# Patient Record
Sex: FEMALE | Race: BLACK OR AFRICAN AMERICAN | Employment: FULL TIME | URBAN - METROPOLITAN AREA
[De-identification: names, ages, dates, MRNs, and addresses within clinical notes are randomized per-mention and may not be internally consistent; named-entity substitution may affect disease eponyms.]

---

## 2017-08-12 ENCOUNTER — HOSPITAL ENCOUNTER (EMERGENCY)
Age: 32
Discharge: HOME | End: 2017-08-12
Attending: EMERGENCY MEDICINE
Payer: COMMERCIAL

## 2017-08-12 VITALS
TEMPERATURE: 97.4 F | SYSTOLIC BLOOD PRESSURE: 148 MMHG | HEIGHT: 62 IN | HEART RATE: 87 BPM | DIASTOLIC BLOOD PRESSURE: 87 MMHG | BODY MASS INDEX: 37.08 KG/M2 | OXYGEN SATURATION: 100 % | RESPIRATION RATE: 16 BRPM | WEIGHT: 201.5 LBS

## 2017-08-12 DIAGNOSIS — L05.01 PILONIDAL ABSCESS: Primary | ICD-10-CM

## 2017-08-12 PROCEDURE — 10060 I&D ABSCESS SIMPLE/SINGLE: CPT

## 2017-08-12 PROCEDURE — 2500000003 HC RX 250 WO HCPCS: Performed by: NURSE PRACTITIONER

## 2017-08-12 PROCEDURE — 87070 CULTURE OTHR SPECIMN AEROBIC: CPT

## 2017-08-12 PROCEDURE — 99282 EMERGENCY DEPT VISIT SF MDM: CPT

## 2017-08-12 PROCEDURE — 87205 SMEAR GRAM STAIN: CPT

## 2017-08-12 RX ORDER — OXYCODONE HYDROCHLORIDE AND ACETAMINOPHEN 5; 325 MG/1; MG/1
1 TABLET ORAL EVERY 4 HOURS PRN
COMMUNITY
End: 2017-12-15

## 2017-08-12 RX ORDER — LIDOCAINE HYDROCHLORIDE 10 MG/ML
20 INJECTION, SOLUTION INFILTRATION; PERINEURAL ONCE
Status: COMPLETED | OUTPATIENT
Start: 2017-08-12 | End: 2017-08-12

## 2017-08-12 RX ORDER — IBUPROFEN 400 MG/1
400 TABLET ORAL EVERY 6 HOURS PRN
COMMUNITY
End: 2017-12-15

## 2017-08-12 RX ORDER — HYDROCODONE BITARTRATE AND ACETAMINOPHEN 5; 325 MG/1; MG/1
1 TABLET ORAL EVERY 6 HOURS PRN
Qty: 15 TABLET | Refills: 0 | Status: SHIPPED | OUTPATIENT
Start: 2017-08-12 | End: 2017-12-15

## 2017-08-12 RX ADMIN — LIDOCAINE HYDROCHLORIDE 20 ML: 10 INJECTION, SOLUTION INFILTRATION; PERINEURAL at 14:35

## 2017-08-12 ASSESSMENT — PAIN DESCRIPTION - LOCATION: LOCATION: COCCYX

## 2017-08-12 ASSESSMENT — ENCOUNTER SYMPTOMS: COLOR CHANGE: 1

## 2017-08-12 ASSESSMENT — PAIN SCALES - GENERAL
PAINLEVEL_OUTOF10: 10
PAINLEVEL_OUTOF10: 10

## 2017-08-13 ENCOUNTER — HOSPITAL ENCOUNTER (EMERGENCY)
Age: 32
Discharge: HOME | End: 2017-08-13
Attending: EMERGENCY MEDICINE
Payer: COMMERCIAL

## 2017-08-13 VITALS
DIASTOLIC BLOOD PRESSURE: 70 MMHG | HEIGHT: 62 IN | SYSTOLIC BLOOD PRESSURE: 122 MMHG | RESPIRATION RATE: 16 BRPM | BODY MASS INDEX: 37.17 KG/M2 | WEIGHT: 202 LBS | HEART RATE: 92 BPM | TEMPERATURE: 97.9 F | OXYGEN SATURATION: 98 %

## 2017-08-13 DIAGNOSIS — Z51.89 WOUND CHECK, ABSCESS: ICD-10-CM

## 2017-08-13 DIAGNOSIS — L05.01 PILONIDAL ABSCESS: Primary | ICD-10-CM

## 2017-08-13 PROCEDURE — 99282 EMERGENCY DEPT VISIT SF MDM: CPT

## 2017-08-13 ASSESSMENT — PAIN SCALES - GENERAL: PAINLEVEL_OUTOF10: 5

## 2017-08-13 ASSESSMENT — ENCOUNTER SYMPTOMS: COLOR CHANGE: 1

## 2017-08-13 ASSESSMENT — PAIN DESCRIPTION - LOCATION: LOCATION: COCCYX

## 2017-08-13 ASSESSMENT — PAIN DESCRIPTION - DESCRIPTORS: DESCRIPTORS: TENDER

## 2017-08-14 LAB
CULTURE: ABNORMAL
CULTURE: ABNORMAL
DIRECT EXAM: ABNORMAL
Lab: ABNORMAL
SPECIMEN DESCRIPTION: ABNORMAL
SPECIMEN DESCRIPTION: ABNORMAL
STATUS: ABNORMAL

## 2017-12-15 ENCOUNTER — HOSPITAL ENCOUNTER (EMERGENCY)
Age: 32
Discharge: HOME | End: 2017-12-15
Attending: EMERGENCY MEDICINE
Payer: COMMERCIAL

## 2017-12-15 ENCOUNTER — APPOINTMENT (OUTPATIENT)
Dept: CT IMAGING | Age: 32
End: 2017-12-15
Payer: COMMERCIAL

## 2017-12-15 VITALS
TEMPERATURE: 98.6 F | RESPIRATION RATE: 16 BRPM | WEIGHT: 208 LBS | SYSTOLIC BLOOD PRESSURE: 130 MMHG | HEIGHT: 62 IN | OXYGEN SATURATION: 100 % | BODY MASS INDEX: 38.28 KG/M2 | HEART RATE: 76 BPM | DIASTOLIC BLOOD PRESSURE: 70 MMHG

## 2017-12-15 DIAGNOSIS — R19.7 NAUSEA VOMITING AND DIARRHEA: Primary | ICD-10-CM

## 2017-12-15 DIAGNOSIS — R11.2 NAUSEA VOMITING AND DIARRHEA: Primary | ICD-10-CM

## 2017-12-15 LAB
-: ABNORMAL
ABSOLUTE EOS #: 0.1 K/UL (ref 0–0.4)
ABSOLUTE IMMATURE GRANULOCYTE: ABNORMAL K/UL (ref 0–0.3)
ABSOLUTE LYMPH #: 3.1 K/UL (ref 1–4.8)
ABSOLUTE MONO #: 0.3 K/UL (ref 0.2–0.8)
AMORPHOUS: ABNORMAL
ANION GAP SERPL CALCULATED.3IONS-SCNC: 13 MMOL/L (ref 9–17)
BACTERIA: ABNORMAL
BASOPHILS # BLD: 1 % (ref 0–2)
BASOPHILS ABSOLUTE: 0 K/UL (ref 0–0.2)
BILIRUBIN URINE: NEGATIVE
BUN BLDV-MCNC: 6 MG/DL (ref 6–20)
BUN/CREAT BLD: 9 (ref 9–20)
CALCIUM SERPL-MCNC: 8.9 MG/DL (ref 8.6–10.4)
CASTS UA: ABNORMAL /LPF
CHLORIDE BLD-SCNC: 103 MMOL/L (ref 98–107)
CHP ED QC CHECK: NORMAL
CO2: 20 MMOL/L (ref 20–31)
COLOR: YELLOW
COMMENT UA: ABNORMAL
CREAT SERPL-MCNC: 0.68 MG/DL (ref 0.5–0.9)
CRYSTALS, UA: ABNORMAL /HPF
DIFFERENTIAL TYPE: ABNORMAL
EOSINOPHILS RELATIVE PERCENT: 1 % (ref 1–4)
EPITHELIAL CELLS UA: ABNORMAL /HPF (ref 0–5)
GFR AFRICAN AMERICAN: >60 ML/MIN
GFR NON-AFRICAN AMERICAN: >60 ML/MIN
GFR SERPL CREATININE-BSD FRML MDRD: NORMAL ML/MIN/{1.73_M2}
GFR SERPL CREATININE-BSD FRML MDRD: NORMAL ML/MIN/{1.73_M2}
GLUCOSE BLD-MCNC: 88 MG/DL (ref 70–99)
GLUCOSE URINE: NEGATIVE
HCT VFR BLD CALC: 33.4 % (ref 36–46)
HEMOGLOBIN: 10.6 G/DL (ref 12–16)
IMMATURE GRANULOCYTES: ABNORMAL %
KETONES, URINE: NEGATIVE
LEUKOCYTE ESTERASE, URINE: NEGATIVE
LYMPHOCYTES # BLD: 36 % (ref 24–44)
MCH RBC QN AUTO: 25.3 PG (ref 26–34)
MCHC RBC AUTO-ENTMCNC: 31.7 G/DL (ref 31–37)
MCV RBC AUTO: 80 FL (ref 80–100)
MONOCYTES # BLD: 3 % (ref 1–7)
MUCUS: ABNORMAL
NITRITE, URINE: NEGATIVE
OTHER OBSERVATIONS UA: ABNORMAL
PDW BLD-RTO: 17.3 % (ref 11.5–14.5)
PH UA: 8 (ref 5–8)
PLATELET # BLD: 264 K/UL (ref 130–400)
PLATELET ESTIMATE: ABNORMAL
PMV BLD AUTO: 8.1 FL (ref 6–12)
POTASSIUM SERPL-SCNC: 4.1 MMOL/L (ref 3.7–5.3)
PREGNANCY TEST URINE, POC: NORMAL
PROTEIN UA: NEGATIVE
RBC # BLD: 4.17 M/UL (ref 4–5.2)
RBC # BLD: ABNORMAL 10*6/UL
RBC UA: ABNORMAL /HPF (ref 0–2)
RENAL EPITHELIAL, UA: ABNORMAL /HPF
SEG NEUTROPHILS: 59 % (ref 36–66)
SEGMENTED NEUTROPHILS ABSOLUTE COUNT: 5.1 K/UL (ref 1.8–7.7)
SODIUM BLD-SCNC: 136 MMOL/L (ref 135–144)
SPECIFIC GRAVITY UA: 1.01 (ref 1–1.03)
TRICHOMONAS: ABNORMAL
TURBIDITY: ABNORMAL
URINE HGB: NEGATIVE
UROBILINOGEN, URINE: NORMAL
WBC # BLD: 8.6 K/UL (ref 3.5–11)
WBC # BLD: ABNORMAL 10*3/UL
WBC UA: ABNORMAL /HPF (ref 0–5)
YEAST: ABNORMAL

## 2017-12-15 PROCEDURE — 84703 CHORIONIC GONADOTROPIN ASSAY: CPT

## 2017-12-15 PROCEDURE — 99284 EMERGENCY DEPT VISIT MOD MDM: CPT

## 2017-12-15 PROCEDURE — 96361 HYDRATE IV INFUSION ADD-ON: CPT

## 2017-12-15 PROCEDURE — 81001 URINALYSIS AUTO W/SCOPE: CPT

## 2017-12-15 PROCEDURE — 85025 COMPLETE CBC W/AUTO DIFF WBC: CPT

## 2017-12-15 PROCEDURE — 74177 CT ABD & PELVIS W/CONTRAST: CPT

## 2017-12-15 PROCEDURE — 96374 THER/PROPH/DIAG INJ IV PUSH: CPT

## 2017-12-15 PROCEDURE — 6360000004 HC RX CONTRAST MEDICATION: Performed by: EMERGENCY MEDICINE

## 2017-12-15 PROCEDURE — 80048 BASIC METABOLIC PNL TOTAL CA: CPT

## 2017-12-15 PROCEDURE — 2580000003 HC RX 258: Performed by: EMERGENCY MEDICINE

## 2017-12-15 PROCEDURE — 6360000002 HC RX W HCPCS: Performed by: EMERGENCY MEDICINE

## 2017-12-15 RX ORDER — 0.9 % SODIUM CHLORIDE 0.9 %
1000 INTRAVENOUS SOLUTION INTRAVENOUS ONCE
Status: COMPLETED | OUTPATIENT
Start: 2017-12-15 | End: 2017-12-15

## 2017-12-15 RX ORDER — SODIUM CHLORIDE 0.9 % (FLUSH) 0.9 %
10 SYRINGE (ML) INJECTION PRN
Status: DISCONTINUED | OUTPATIENT
Start: 2017-12-15 | End: 2017-12-15 | Stop reason: HOSPADM

## 2017-12-15 RX ORDER — OMEPRAZOLE 20 MG/1
20 CAPSULE, DELAYED RELEASE ORAL DAILY
Qty: 15 CAPSULE | Refills: 0 | Status: SHIPPED | OUTPATIENT
Start: 2017-12-15 | End: 2018-09-12 | Stop reason: ALTCHOICE

## 2017-12-15 RX ORDER — ONDANSETRON 2 MG/ML
8 INJECTION INTRAMUSCULAR; INTRAVENOUS ONCE
Status: COMPLETED | OUTPATIENT
Start: 2017-12-15 | End: 2017-12-15

## 2017-12-15 RX ORDER — ONDANSETRON 4 MG/1
4 TABLET, FILM COATED ORAL EVERY 6 HOURS PRN
Qty: 10 TABLET | Refills: 0 | Status: SHIPPED | OUTPATIENT
Start: 2017-12-15 | End: 2018-09-12 | Stop reason: ALTCHOICE

## 2017-12-15 RX ORDER — 0.9 % SODIUM CHLORIDE 0.9 %
50 INTRAVENOUS SOLUTION INTRAVENOUS ONCE
Status: COMPLETED | OUTPATIENT
Start: 2017-12-15 | End: 2017-12-15

## 2017-12-15 RX ADMIN — Medication 10 ML: at 17:16

## 2017-12-15 RX ADMIN — SODIUM CHLORIDE 1000 ML: 9 INJECTION, SOLUTION INTRAVENOUS at 17:03

## 2017-12-15 RX ADMIN — SODIUM CHLORIDE 50 ML: 9 INJECTION, SOLUTION INTRAVENOUS at 17:16

## 2017-12-15 RX ADMIN — IOPAMIDOL 125 ML: 755 INJECTION, SOLUTION INTRAVENOUS at 17:15

## 2017-12-15 RX ADMIN — ONDANSETRON 8 MG: 2 INJECTION INTRAMUSCULAR; INTRAVENOUS at 17:03

## 2017-12-15 ASSESSMENT — PAIN SCALES - GENERAL: PAINLEVEL_OUTOF10: 7

## 2017-12-15 NOTE — ED PROVIDER NOTES
Partners: Female     Other Topics Concern    None     Social History Narrative    None       SCREENINGS             PHYSICAL EXAM    (up to 7 for level 4, 8 or more for level 5)     ED Triage Vitals [12/15/17 1623]   BP Temp Temp Source Pulse Resp SpO2 Height Weight   130/70 98.6 °F (37 °C) Oral 76 16 100 % 5' 2\" (1.575 m) 208 lb (94.3 kg)       Physical Exam   Constitutional: She appears well-developed and well-nourished. No distress. HENT:   Head: Normocephalic. Right Ear: External ear normal.   Left Ear: External ear normal.   Nose: Nose normal.   Mouth/Throat: Oropharynx is clear and moist.   Eyes: EOM are normal. Pupils are equal, round, and reactive to light. Neck: Neck supple. Cardiovascular: Normal rate, regular rhythm and normal heart sounds. Pulmonary/Chest: Effort normal and breath sounds normal. No respiratory distress. Abdominal: Normal appearance. She exhibits no distension and no mass. Bowel sounds are decreased. There is no hepatosplenomegaly. There is tenderness in the right lower quadrant, suprapubic area and left lower quadrant. There is no rigidity, no rebound, no guarding and no CVA tenderness. Musculoskeletal: Normal range of motion. Skin: Skin is warm and dry. No pallor. Vitals reviewed. DIAGNOSTIC RESULTS     EKG: All EKG's are interpreted by the Emergency Department Physician who either signs or Co-signs this chart in the absence of a cardiologist.    RADIOLOGY:   Non-plain film images such as CT, Ultrasound and MRI are read by the radiologist. Plain radiographic images are visualized and preliminarily interpreted by the emergency physician with the below findings:    Interpretation per the Radiologist below, if available at the time of this note:    Zain   Final Result   1. No CT evidence for acute appendicitis. Normal gas-filled appendix   identified.    2. Low-density lesion which is indeterminate at the anterior midpole of the left kidney measuring 1.4 cm. Initial further evaluation with renal   ultrasound is recommended to confirm that this is indeed a cyst.   3. No hydronephrosis. 4. Small amount of free fluid in the pelvis. 5. Retroaortic left renal vein. 6. Possible fibroid uterus. Consider further evaluation with nonemergent   pelvic ultrasound. 7. Prior cholecystectomy. ED BEDSIDE ULTRASOUND:   Performed by ED Physician - none    LABS:  Labs Reviewed   UA W/REFLEX CULTURE - Abnormal; Notable for the following:        Result Value    Turbidity UA SLIGHTLY CLOUDY (*)     All other components within normal limits   MICROSCOPIC URINALYSIS - Abnormal; Notable for the following:     Bacteria, UA FEW (*)     All other components within normal limits   CBC WITH AUTO DIFFERENTIAL - Abnormal; Notable for the following:     Hemoglobin 10.6 (*)     Hematocrit 33.4 (*)     MCH 25.3 (*)     RDW 17.3 (*)     All other components within normal limits   POCT URINE PREGNANCY - Normal   BASIC METABOLIC PANEL   POCT URINALYSIS DIPSTICK       All other labs were within normal range or not returned as of this dictation. EMERGENCY DEPARTMENT COURSE and DIFFERENTIAL DIAGNOSIS/MDM:   Vitals:    Vitals:    12/15/17 1623   BP: 130/70   Pulse: 76   Resp: 16   Temp: 98.6 °F (37 °C)   TempSrc: Oral   SpO2: 100%   Weight: 208 lb (94.3 kg)   Height: 5' 2\" (1.575 m)       CT abdomen is negative for acute appendicitis or other acute surgical pathology. Patient's blood work is reviewed and is otherwise unremarkable. She is treated with IV fluids and IV Zofran and placed on Zofran and omeprazole upon discharge. MDM    CONSULTS:  None    PROCEDURES:  Unless otherwise noted below, none     Procedures    FINAL IMPRESSION      1.  Nausea vomiting and diarrhea          DISPOSITION/PLAN   DISPOSITION Decision to Discharge    PATIENT REFERRED TO:  [facility] ED  [address] [facility]  [phone]    As needed, If symptoms worsen    Primary Care Physician  Call [phone] for physician referral.  Schedule an appointment as soon as possible for a visit         DISCHARGE MEDICATIONS:  New Prescriptions    OMEPRAZOLE (PRILOSEC) 20 MG DELAYED RELEASE CAPSULE    Take 1 capsule by mouth daily    ONDANSETRON (ZOFRAN) 4 MG TABLET    Take 1 tablet by mouth every 6 hours as needed for Nausea or Vomiting          Problem List:  Patient Active Problem List   Diagnosis Code    Pilonidal cyst L05.91           Summation      Patient Course:  Stable    ED Medications administered this visit:    Medications   sodium chloride flush 0.9 % injection 10 mL (10 mLs Intravenous Given 12/15/17 1716)   ondansetron (ZOFRAN) injection 8 mg (8 mg Intravenous Given 12/15/17 1703)   0.9 % sodium chloride bolus (1,000 mLs Intravenous New Bag 12/15/17 1703)   iopamidol (ISOVUE-370) 76 % injection 125 mL (125 mLs Intravenous Given 12/15/17 1715)   0.9 % sodium chloride bolus (0 mLs Intravenous Stopped 12/15/17 1752)       New Prescriptions from this visit:    New Prescriptions    OMEPRAZOLE (PRILOSEC) 20 MG DELAYED RELEASE CAPSULE    Take 1 capsule by mouth daily    ONDANSETRON (ZOFRAN) 4 MG TABLET    Take 1 tablet by mouth every 6 hours as needed for Nausea or Vomiting       Follow-up:  [address] [place] [address] ED  [address] [facility]  [phone]    As needed, If symptoms worsen    Primary Care Physician  Call [phone] for physician referral.  Schedule an appointment as soon as possible for a visit           Final Impression:   1.  Nausea vomiting and diarrhea               (Please note that portions of this note were completed with a voice recognition program.  Efforts were made to edit the dictations but occasionally words are mis-transcribed.)    Delaney Aburto MD (electronically signed)  Attending Emergency Physician            Delaney Aburto MD  12/15/17 5530

## 2017-12-18 LAB — HCG, PREGNANCY URINE (POC): NEGATIVE

## 2018-09-12 ENCOUNTER — OFFICE VISIT (OUTPATIENT)
Dept: INTERNAL MEDICINE CLINIC | Age: 33
End: 2018-09-12
Payer: MEDICAID

## 2018-09-12 VITALS
HEART RATE: 88 BPM | DIASTOLIC BLOOD PRESSURE: 76 MMHG | BODY MASS INDEX: 37.02 KG/M2 | SYSTOLIC BLOOD PRESSURE: 122 MMHG | WEIGHT: 201.2 LBS | RESPIRATION RATE: 20 BRPM | HEIGHT: 62 IN

## 2018-09-12 DIAGNOSIS — Z13.31 POSITIVE DEPRESSION SCREENING: ICD-10-CM

## 2018-09-12 DIAGNOSIS — H92.01 RIGHT EAR PAIN: ICD-10-CM

## 2018-09-12 DIAGNOSIS — E55.9 VITAMIN D DEFICIENCY: ICD-10-CM

## 2018-09-12 DIAGNOSIS — Z13.29 THYROID DISORDER SCREENING: ICD-10-CM

## 2018-09-12 DIAGNOSIS — Z13.220 SCREENING CHOLESTEROL LEVEL: ICD-10-CM

## 2018-09-12 DIAGNOSIS — E66.09 CLASS 2 OBESITY DUE TO EXCESS CALORIES WITHOUT SERIOUS COMORBIDITY WITH BODY MASS INDEX (BMI) OF 36.0 TO 36.9 IN ADULT: Primary | ICD-10-CM

## 2018-09-12 DIAGNOSIS — R35.0 URINARY FREQUENCY: ICD-10-CM

## 2018-09-12 LAB — HBA1C MFR BLD: 5.4 %

## 2018-09-12 PROCEDURE — 1036F TOBACCO NON-USER: CPT | Performed by: NURSE PRACTITIONER

## 2018-09-12 PROCEDURE — 99204 OFFICE O/P NEW MOD 45 MIN: CPT | Performed by: NURSE PRACTITIONER

## 2018-09-12 PROCEDURE — G8417 CALC BMI ABV UP PARAM F/U: HCPCS | Performed by: NURSE PRACTITIONER

## 2018-09-12 PROCEDURE — 96160 PT-FOCUSED HLTH RISK ASSMT: CPT | Performed by: NURSE PRACTITIONER

## 2018-09-12 PROCEDURE — 83036 HEMOGLOBIN GLYCOSYLATED A1C: CPT | Performed by: NURSE PRACTITIONER

## 2018-09-12 PROCEDURE — G8431 POS CLIN DEPRES SCRN F/U DOC: HCPCS | Performed by: NURSE PRACTITIONER

## 2018-09-12 PROCEDURE — G8427 DOCREV CUR MEDS BY ELIG CLIN: HCPCS | Performed by: NURSE PRACTITIONER

## 2018-09-12 ASSESSMENT — PATIENT HEALTH QUESTIONNAIRE - PHQ9
4. FEELING TIRED OR HAVING LITTLE ENERGY: 1
7. TROUBLE CONCENTRATING ON THINGS, SUCH AS READING THE NEWSPAPER OR WATCHING TELEVISION: 1
5. POOR APPETITE OR OVEREATING: 2
SUM OF ALL RESPONSES TO PHQ QUESTIONS 1-9: 11
10. IF YOU CHECKED OFF ANY PROBLEMS, HOW DIFFICULT HAVE THESE PROBLEMS MADE IT FOR YOU TO DO YOUR WORK, TAKE CARE OF THINGS AT HOME, OR GET ALONG WITH OTHER PEOPLE: 0
6. FEELING BAD ABOUT YOURSELF - OR THAT YOU ARE A FAILURE OR HAVE LET YOURSELF OR YOUR FAMILY DOWN: 0
8. MOVING OR SPEAKING SO SLOWLY THAT OTHER PEOPLE COULD HAVE NOTICED. OR THE OPPOSITE, BEING SO FIGETY OR RESTLESS THAT YOU HAVE BEEN MOVING AROUND A LOT MORE THAN USUAL: 0
9. THOUGHTS THAT YOU WOULD BE BETTER OFF DEAD, OR OF HURTING YOURSELF: 0
1. LITTLE INTEREST OR PLEASURE IN DOING THINGS: 3
3. TROUBLE FALLING OR STAYING ASLEEP: 3
SUM OF ALL RESPONSES TO PHQ QUESTIONS 1-9: 11
SUM OF ALL RESPONSES TO PHQ9 QUESTIONS 1 & 2: 4
2. FEELING DOWN, DEPRESSED OR HOPELESS: 1

## 2018-09-12 NOTE — PROGRESS NOTES
Smoking status: Former Smoker    Smokeless tobacco: Never Used    Alcohol use Yes      Comment: occ     ALLERGIES:  No Known Allergies       Subjective     · Constitutional:  Negative for activity change, appetite change, chills, fatigue, fever and unexpected weight change. · HENT: Negative for congestion, rhinorrhea, sinus pain, sinus pressure and sore throat. Positive for right ear discomfort  · Eyes:  Negative for pain and discharge. · Respiratory:  Negative for cough, chest tightness, shortness of breath and wheezing. · Cardiovascular:  Negative for chest pain, palpitations and leg swelling. · Gastrointestinal: Negative for abdominal pain, blood in stool, constipation,diarrhea, nausea and vomiting. · Endocrine: Negative for cold intolerance, heat intolerance, polydipsia, polyphagia and polyuria. · Genitourinary: Negative for difficulty urinating, dysuria, flank pain, hematuria and urgency. Positive for urinary frequency  · Musculoskeletal: Negative for arthralgias, back pain, joint swelling, myalgias, neck pain and neck stiffness. · Skin: Negative for rash and wound. · Allergic/Immunologic: Negative for environmental allergies and food allergies. · Neurological:  Negative for dizziness, light-headedness, numbness and headaches. · Hematological:  Negative for adenopathy. Does not bruise/bleed easily. · Psychiatric/Behavioral: Negative for self-injury, sleep disturbance and suicidal ideas. Positive for depression    Objective     PHYSICAL EXAM:   · Constitutional: Catherine East is oriented to person, place, and time. Vital signs are normal. Appears well-developed and well-nourished. · HEENT:   · Head: Normocephalic and atraumatic.    Right Ear: Hearing and external ear normal. negative findings: external ears normal to inspection and palpation, no mastoid process tenderness, positive findings: R TM - mild erythema, moderate amount of cerumen on right  Left Ear: Hearing and external ear normal. Negative findings: no mastoid process tenderness. L TM - no erythema, small amt of cerumen on left  · Nose: Nose: Nares normal. Septum midline. Mucosa normal. No drainage or sinus tenderness. · Mouth/Throat: Oropharynx-no erythema, no exudate. Uvula midline, no erythema, no edema. Mucous membranes are pink and moist.   · Eyes:PERRL, EOMI, Conjunctiva normal, No discharge. · Neck: Trachea normal, full passive range of motion. Non-tender on palpation. Neck supple. No thyromegaly present. · Cardiovascular: Normal rate, regular rhythm, S1, S2, no murmur, no gallop, no friction rub, intact distal pulses. · Pulmonary/Chest: Breath sounds are clear throughout, No respiratory distress, No wheezing, No chest tenderness. Effort normal  · Abdominal: Soft. Normal appearance, bowel sounds are present and normoactive. There is no hepatosplenomegaly. There is no tenderness. There is no CVA tenderness. · Musculoskeletal: Extremities appear regular and symmetric. No evident masses, lesions, foreign bodies, or other abnormalities. No edema. No tenderness on palpation. Joints are stable. Full ROM, strength and tone are within normal limits. · Lymphadenopathy: No cervical lymphadenopathy noted. · Neurological: Alert and oriented to person, place, and time. Normal motor function, Normal sensory function, No focal deficits noted. He has normal strength. · Skin: Skin is warm, dry and intact. No obvious lesions on exposed skin  · Psychiatric: Normal mood and affect. Speech is normal and behavior is normal.     · Nursing note and vitals reviewed. Blood pressure 122/76, pulse 88, resp. rate 20, height 5' 2\" (1.575 m), weight 201 lb 3.2 oz (91.3 kg). Body mass index is 36.8 kg/m².     Wt Readings from Last 3 Encounters:   09/12/18 201 lb 3.2 oz (91.3 kg)   12/15/17 208 lb (94.3 kg)   08/13/17 202 lb (91.6 kg)     BP Readings from Last 3 Encounters:   09/12/18 122/76   12/15/17 130/70   08/13/17 122/70 PCP.      Return in about 3 months (around 12/12/2018), or if symptoms worsen or fail to improve, for Routine follow up of chronic conditions. 1.  Jeane received counseling on the following healthy behaviors: nutrition, exercise and medication adherence  2. Patient given educational materials - see patient instructions  3. Was a self-tracking handout given in paper form or via [email or web address]? No  If yes, see orders or list here. 4.  Discussed use, benefit, and side effects of prescribed medications. Barriers to medication compliance addressed. All patient questions answered. Pt voiced understanding. 5.  Reviewed prior labs, imaging, consultation, follow up, and health maintenance  6. Continue current medications, diet and exercise. 7. Discussed use, benefit, and side effects of prescribed medications. Barriers to medication compliance addressed. All her questions were answered. Pt voiced understanding. Francisco Sal will continue current medications, diet and exercise. Completed Orders/Prescriptions   No orders of the defined types were placed in this encounter. Patient given educational materials on depression, healthy diet and exercise    Of the 45 minute duration appointment visit, Nessa Cheek CNP spent at least 50% of the face-to-face time in counseling, explanation of diagnosis, planning of further management, and answering all questions. Signed:  Nessa Cheek CNP    This note is created with the assistance of a speech-recognition program.  While intending to generate a document that actually reflects the content of the visit, no guarantees can be provided that every mistake has been identified and corrected by editing.

## 2019-01-11 ENCOUNTER — TELEPHONE (OUTPATIENT)
Dept: SURGERY | Age: 34
End: 2019-01-11

## 2019-05-08 ENCOUNTER — HOSPITAL ENCOUNTER (OUTPATIENT)
Age: 34
Setting detail: OBSERVATION
Discharge: HOME | End: 2019-05-08
Attending: EMERGENCY MEDICINE | Admitting: FAMILY MEDICINE
Payer: MEDICAID

## 2019-05-08 ENCOUNTER — APPOINTMENT (OUTPATIENT)
Dept: CT IMAGING | Age: 34
End: 2019-05-08
Payer: MEDICAID

## 2019-05-08 VITALS
TEMPERATURE: 98.4 F | BODY MASS INDEX: 38.09 KG/M2 | HEIGHT: 62 IN | SYSTOLIC BLOOD PRESSURE: 129 MMHG | OXYGEN SATURATION: 100 % | DIASTOLIC BLOOD PRESSURE: 75 MMHG | WEIGHT: 207 LBS | RESPIRATION RATE: 16 BRPM | HEART RATE: 92 BPM

## 2019-05-08 DIAGNOSIS — L03.317 ABSCESS AND CELLULITIS OF GLUTEAL REGION: Primary | ICD-10-CM

## 2019-05-08 DIAGNOSIS — Z98.890 HISTORY OF SURGICAL REMOVAL OF PILONIDAL CYST: ICD-10-CM

## 2019-05-08 DIAGNOSIS — L02.31 ABSCESS AND CELLULITIS OF GLUTEAL REGION: Primary | ICD-10-CM

## 2019-05-08 DIAGNOSIS — L05.91 PILONIDAL CYST: ICD-10-CM

## 2019-05-08 DIAGNOSIS — M46.28 ABSCESS OF SACRUM (HCC): ICD-10-CM

## 2019-05-08 PROBLEM — L03.319 CELLULITIS OF SACRAL REGION: Status: ACTIVE | Noted: 2019-05-08

## 2019-05-08 LAB
ABSOLUTE EOS #: 0.03 K/UL (ref 0–0.44)
ABSOLUTE IMMATURE GRANULOCYTE: 0.07 K/UL (ref 0–0.3)
ABSOLUTE LYMPH #: 2.62 K/UL (ref 1.1–3.7)
ABSOLUTE MONO #: 1.01 K/UL (ref 0.1–1.2)
ANION GAP SERPL CALCULATED.3IONS-SCNC: 15 MMOL/L (ref 9–17)
BASOPHILS # BLD: 0 % (ref 0–2)
BASOPHILS ABSOLUTE: 0.04 K/UL (ref 0–0.2)
BUN BLDV-MCNC: 5 MG/DL (ref 6–20)
BUN/CREAT BLD: 7 (ref 9–20)
C-PEPTIDE: 3.3 NG/ML (ref 1.1–4.4)
CALCIUM SERPL-MCNC: 8.9 MG/DL (ref 8.6–10.4)
CHLORIDE BLD-SCNC: 101 MMOL/L (ref 98–107)
CO2: 21 MMOL/L (ref 20–31)
CREAT SERPL-MCNC: 0.7 MG/DL (ref 0.5–0.9)
DIFFERENTIAL TYPE: ABNORMAL
EOSINOPHILS RELATIVE PERCENT: 0 % (ref 1–4)
ESTIMATED AVERAGE GLUCOSE: 94 MG/DL
GFR AFRICAN AMERICAN: >60 ML/MIN
GFR NON-AFRICAN AMERICAN: >60 ML/MIN
GFR SERPL CREATININE-BSD FRML MDRD: ABNORMAL ML/MIN/{1.73_M2}
GFR SERPL CREATININE-BSD FRML MDRD: ABNORMAL ML/MIN/{1.73_M2}
GLUCOSE BLD-MCNC: 102 MG/DL (ref 65–105)
GLUCOSE BLD-MCNC: 147 MG/DL (ref 70–99)
GLUCOSE BLD-MCNC: 97 MG/DL (ref 65–105)
HBA1C MFR BLD: 4.9 % (ref 4–6)
HCT VFR BLD CALC: 35.1 % (ref 36.3–47.1)
HEMOGLOBIN: 11.2 G/DL (ref 11.9–15.1)
IMMATURE GRANULOCYTES: 1 %
INSULIN COMMENT: NORMAL
INSULIN REFERENCE RANGE:: NORMAL
INSULIN: 23.8 MU/L
LACTIC ACID: 1.9 MMOL/L (ref 0.5–2.2)
LYMPHOCYTES # BLD: 20 % (ref 24–43)
MCH RBC QN AUTO: 27.2 PG (ref 25.2–33.5)
MCHC RBC AUTO-ENTMCNC: 31.9 G/DL (ref 28.4–34.8)
MCV RBC AUTO: 85.2 FL (ref 82.6–102.9)
MONOCYTES # BLD: 8 % (ref 3–12)
NRBC AUTOMATED: 0 PER 100 WBC
PDW BLD-RTO: 13.6 % (ref 11.8–14.4)
PLATELET # BLD: 313 K/UL (ref 138–453)
PLATELET ESTIMATE: ABNORMAL
PMV BLD AUTO: 9.6 FL (ref 8.1–13.5)
POTASSIUM SERPL-SCNC: 3 MMOL/L (ref 3.7–5.3)
POTASSIUM SERPL-SCNC: 3.7 MMOL/L (ref 3.7–5.3)
RBC # BLD: 4.12 M/UL (ref 3.95–5.11)
RBC # BLD: ABNORMAL 10*6/UL
SEG NEUTROPHILS: 71 % (ref 36–65)
SEGMENTED NEUTROPHILS ABSOLUTE COUNT: 9.7 K/UL (ref 1.5–8.1)
SODIUM BLD-SCNC: 137 MMOL/L (ref 135–144)
WBC # BLD: 13.5 K/UL (ref 3.5–11.3)
WBC # BLD: ABNORMAL 10*3/UL

## 2019-05-08 PROCEDURE — 87205 SMEAR GRAM STAIN: CPT

## 2019-05-08 PROCEDURE — 2500000003 HC RX 250 WO HCPCS: Performed by: NURSE PRACTITIONER

## 2019-05-08 PROCEDURE — 2500000003 HC RX 250 WO HCPCS: Performed by: EMERGENCY MEDICINE

## 2019-05-08 PROCEDURE — 2580000003 HC RX 258: Performed by: EMERGENCY MEDICINE

## 2019-05-08 PROCEDURE — 72193 CT PELVIS W/DYE: CPT

## 2019-05-08 PROCEDURE — 87040 BLOOD CULTURE FOR BACTERIA: CPT

## 2019-05-08 PROCEDURE — 96365 THER/PROPH/DIAG IV INF INIT: CPT

## 2019-05-08 PROCEDURE — 6370000000 HC RX 637 (ALT 250 FOR IP): Performed by: INTERNAL MEDICINE

## 2019-05-08 PROCEDURE — 86341 ISLET CELL ANTIBODY: CPT

## 2019-05-08 PROCEDURE — 80048 BASIC METABOLIC PNL TOTAL CA: CPT

## 2019-05-08 PROCEDURE — 99235 HOSP IP/OBS SAME DATE MOD 70: CPT | Performed by: INTERNAL MEDICINE

## 2019-05-08 PROCEDURE — 36415 COLL VENOUS BLD VENIPUNCTURE: CPT

## 2019-05-08 PROCEDURE — 87070 CULTURE OTHR SPECIMN AEROBIC: CPT

## 2019-05-08 PROCEDURE — 85025 COMPLETE CBC W/AUTO DIFF WBC: CPT

## 2019-05-08 PROCEDURE — 6360000002 HC RX W HCPCS: Performed by: EMERGENCY MEDICINE

## 2019-05-08 PROCEDURE — 84132 ASSAY OF SERUM POTASSIUM: CPT

## 2019-05-08 PROCEDURE — 96376 TX/PRO/DX INJ SAME DRUG ADON: CPT

## 2019-05-08 PROCEDURE — 96375 TX/PRO/DX INJ NEW DRUG ADDON: CPT

## 2019-05-08 PROCEDURE — 99284 EMERGENCY DEPT VISIT MOD MDM: CPT

## 2019-05-08 PROCEDURE — 83525 ASSAY OF INSULIN: CPT

## 2019-05-08 PROCEDURE — 2580000003 HC RX 258: Performed by: NURSE PRACTITIONER

## 2019-05-08 PROCEDURE — 82947 ASSAY GLUCOSE BLOOD QUANT: CPT

## 2019-05-08 PROCEDURE — 2580000003 HC RX 258: Performed by: SURGERY

## 2019-05-08 PROCEDURE — 6370000000 HC RX 637 (ALT 250 FOR IP): Performed by: NURSE PRACTITIONER

## 2019-05-08 PROCEDURE — G0378 HOSPITAL OBSERVATION PER HR: HCPCS

## 2019-05-08 PROCEDURE — 83036 HEMOGLOBIN GLYCOSYLATED A1C: CPT

## 2019-05-08 PROCEDURE — 6370000000 HC RX 637 (ALT 250 FOR IP): Performed by: EMERGENCY MEDICINE

## 2019-05-08 PROCEDURE — 96368 THER/DIAG CONCURRENT INF: CPT

## 2019-05-08 PROCEDURE — 96367 TX/PROPH/DG ADDL SEQ IV INF: CPT

## 2019-05-08 PROCEDURE — 84681 ASSAY OF C-PEPTIDE: CPT

## 2019-05-08 PROCEDURE — 83605 ASSAY OF LACTIC ACID: CPT

## 2019-05-08 PROCEDURE — 6360000004 HC RX CONTRAST MEDICATION: Performed by: SURGERY

## 2019-05-08 RX ORDER — ONDANSETRON 2 MG/ML
4 INJECTION INTRAMUSCULAR; INTRAVENOUS EVERY 6 HOURS PRN
Status: DISCONTINUED | OUTPATIENT
Start: 2019-05-08 | End: 2019-05-08 | Stop reason: SDUPTHER

## 2019-05-08 RX ORDER — ONDANSETRON 2 MG/ML
4 INJECTION INTRAMUSCULAR; INTRAVENOUS EVERY 6 HOURS PRN
Status: DISCONTINUED | OUTPATIENT
Start: 2019-05-08 | End: 2019-05-08 | Stop reason: HOSPADM

## 2019-05-08 RX ORDER — POTASSIUM CHLORIDE 20 MEQ/1
40 TABLET, EXTENDED RELEASE ORAL PRN
Status: DISCONTINUED | OUTPATIENT
Start: 2019-05-08 | End: 2019-05-08 | Stop reason: HOSPADM

## 2019-05-08 RX ORDER — POTASSIUM CHLORIDE 7.45 MG/ML
10 INJECTION INTRAVENOUS ONCE
Status: COMPLETED | OUTPATIENT
Start: 2019-05-08 | End: 2019-05-08

## 2019-05-08 RX ORDER — METRONIDAZOLE 500 MG/1
500 TABLET ORAL 3 TIMES DAILY
Qty: 30 TABLET | Refills: 0 | Status: SHIPPED | OUTPATIENT
Start: 2019-05-08 | End: 2019-05-18

## 2019-05-08 RX ORDER — HYDROCODONE BITARTRATE AND ACETAMINOPHEN 5; 325 MG/1; MG/1
1 TABLET ORAL EVERY 6 HOURS PRN
Qty: 20 TABLET | Refills: 0 | Status: SHIPPED | OUTPATIENT
Start: 2019-05-08 | End: 2019-05-13

## 2019-05-08 RX ORDER — SODIUM CHLORIDE 0.9 % (FLUSH) 0.9 %
10 SYRINGE (ML) INJECTION EVERY 12 HOURS SCHEDULED
Status: DISCONTINUED | OUTPATIENT
Start: 2019-05-08 | End: 2019-05-08 | Stop reason: HOSPADM

## 2019-05-08 RX ORDER — ACETAMINOPHEN 500 MG
1000 TABLET ORAL ONCE
Status: COMPLETED | OUTPATIENT
Start: 2019-05-08 | End: 2019-05-08

## 2019-05-08 RX ORDER — NICOTINE 21 MG/24HR
1 PATCH, TRANSDERMAL 24 HOURS TRANSDERMAL DAILY PRN
Status: DISCONTINUED | OUTPATIENT
Start: 2019-05-08 | End: 2019-05-08 | Stop reason: HOSPADM

## 2019-05-08 RX ORDER — POTASSIUM CHLORIDE 7.45 MG/ML
10 INJECTION INTRAVENOUS PRN
Status: DISCONTINUED | OUTPATIENT
Start: 2019-05-08 | End: 2019-05-08 | Stop reason: HOSPADM

## 2019-05-08 RX ORDER — SODIUM CHLORIDE 9 MG/ML
INJECTION, SOLUTION INTRAVENOUS CONTINUOUS
Status: DISCONTINUED | OUTPATIENT
Start: 2019-05-08 | End: 2019-05-08 | Stop reason: HOSPADM

## 2019-05-08 RX ORDER — POTASSIUM CHLORIDE 20 MEQ/1
40 TABLET, EXTENDED RELEASE ORAL ONCE
Status: COMPLETED | OUTPATIENT
Start: 2019-05-08 | End: 2019-05-08

## 2019-05-08 RX ORDER — CIPROFLOXACIN 500 MG/1
500 TABLET, FILM COATED ORAL 2 TIMES DAILY
Qty: 20 TABLET | Refills: 0 | Status: SHIPPED | OUTPATIENT
Start: 2019-05-08 | End: 2019-05-18

## 2019-05-08 RX ORDER — SODIUM CHLORIDE 0.9 % (FLUSH) 0.9 %
10 SYRINGE (ML) INJECTION PRN
Status: DISCONTINUED | OUTPATIENT
Start: 2019-05-08 | End: 2019-05-08 | Stop reason: HOSPADM

## 2019-05-08 RX ORDER — MAGNESIUM SULFATE 1 G/100ML
1 INJECTION INTRAVENOUS PRN
Status: DISCONTINUED | OUTPATIENT
Start: 2019-05-08 | End: 2019-05-08 | Stop reason: HOSPADM

## 2019-05-08 RX ORDER — 0.9 % SODIUM CHLORIDE 0.9 %
20 INTRAVENOUS SOLUTION INTRAVENOUS ONCE
Status: COMPLETED | OUTPATIENT
Start: 2019-05-08 | End: 2019-05-08

## 2019-05-08 RX ORDER — ONDANSETRON 2 MG/ML
4 INJECTION INTRAMUSCULAR; INTRAVENOUS ONCE
Status: COMPLETED | OUTPATIENT
Start: 2019-05-08 | End: 2019-05-08

## 2019-05-08 RX ORDER — 0.9 % SODIUM CHLORIDE 0.9 %
80 INTRAVENOUS SOLUTION INTRAVENOUS ONCE
Status: COMPLETED | OUTPATIENT
Start: 2019-05-08 | End: 2019-05-08

## 2019-05-08 RX ORDER — ACETAMINOPHEN 325 MG/1
650 TABLET ORAL EVERY 4 HOURS PRN
Status: DISCONTINUED | OUTPATIENT
Start: 2019-05-08 | End: 2019-05-08 | Stop reason: HOSPADM

## 2019-05-08 RX ORDER — HYDROCODONE BITARTRATE AND ACETAMINOPHEN 5; 325 MG/1; MG/1
1 TABLET ORAL EVERY 6 HOURS PRN
Status: DISCONTINUED | OUTPATIENT
Start: 2019-05-08 | End: 2019-05-08 | Stop reason: HOSPADM

## 2019-05-08 RX ORDER — HYDROMORPHONE HYDROCHLORIDE 1 MG/ML
0.6 INJECTION, SOLUTION INTRAMUSCULAR; INTRAVENOUS; SUBCUTANEOUS ONCE
Status: COMPLETED | OUTPATIENT
Start: 2019-05-08 | End: 2019-05-08

## 2019-05-08 RX ORDER — ONDANSETRON 4 MG/1
4 TABLET, ORALLY DISINTEGRATING ORAL EVERY 6 HOURS PRN
Status: DISCONTINUED | OUTPATIENT
Start: 2019-05-08 | End: 2019-05-08 | Stop reason: HOSPADM

## 2019-05-08 RX ADMIN — HYDROMORPHONE HYDROCHLORIDE 0.6 MG: 1 INJECTION, SOLUTION INTRAMUSCULAR; INTRAVENOUS; SUBCUTANEOUS at 02:19

## 2019-05-08 RX ADMIN — TAZOBACTAM SODIUM AND PIPERACILLIN SODIUM 3.38 G: 375; 3 INJECTION, SOLUTION INTRAVENOUS at 12:02

## 2019-05-08 RX ADMIN — IBUPROFEN 600 MG: 100 SUSPENSION ORAL at 02:17

## 2019-05-08 RX ADMIN — HYDROCODONE BITARTRATE AND ACETAMINOPHEN 1 TABLET: 5; 325 TABLET ORAL at 12:42

## 2019-05-08 RX ADMIN — SODIUM CHLORIDE 1878 ML: 9 INJECTION, SOLUTION INTRAVENOUS at 02:20

## 2019-05-08 RX ADMIN — ACETAMINOPHEN 1000 MG: 500 TABLET ORAL at 02:17

## 2019-05-08 RX ADMIN — VANCOMYCIN HYDROCHLORIDE 2500 MG: 1 INJECTION, POWDER, LYOPHILIZED, FOR SOLUTION INTRAVENOUS at 03:25

## 2019-05-08 RX ADMIN — SODIUM CHLORIDE 80 ML: 9 INJECTION, SOLUTION INTRAVENOUS at 06:52

## 2019-05-08 RX ADMIN — Medication 10 ML: at 06:52

## 2019-05-08 RX ADMIN — ONDANSETRON 4 MG: 2 INJECTION INTRAMUSCULAR; INTRAVENOUS at 02:18

## 2019-05-08 RX ADMIN — POTASSIUM CHLORIDE 10 MEQ: 7.46 INJECTION, SOLUTION INTRAVENOUS at 03:34

## 2019-05-08 RX ADMIN — TAZOBACTAM SODIUM AND PIPERACILLIN SODIUM 3.38 G: 375; 3 INJECTION, SOLUTION INTRAVENOUS at 02:23

## 2019-05-08 RX ADMIN — POTASSIUM CHLORIDE 40 MEQ: 20 TABLET, EXTENDED RELEASE ORAL at 06:54

## 2019-05-08 RX ADMIN — Medication 10 ML: at 08:58

## 2019-05-08 RX ADMIN — SODIUM CHLORIDE: 9 INJECTION, SOLUTION INTRAVENOUS at 10:37

## 2019-05-08 RX ADMIN — POTASSIUM CHLORIDE 10 MEQ: 7.46 INJECTION, SOLUTION INTRAVENOUS at 04:24

## 2019-05-08 RX ADMIN — IOPAMIDOL 75 ML: 755 INJECTION, SOLUTION INTRAVENOUS at 06:53

## 2019-05-08 ASSESSMENT — PAIN DESCRIPTION - LOCATION
LOCATION: SACRUM
LOCATION: BUTTOCKS

## 2019-05-08 ASSESSMENT — PAIN SCALES - GENERAL
PAINLEVEL_OUTOF10: 8
PAINLEVEL_OUTOF10: 6
PAINLEVEL_OUTOF10: 10

## 2019-05-08 ASSESSMENT — PAIN DESCRIPTION - PAIN TYPE
TYPE: ACUTE PAIN

## 2019-05-08 ASSESSMENT — PAIN DESCRIPTION - PROGRESSION
CLINICAL_PROGRESSION: NOT CHANGED
CLINICAL_PROGRESSION: NOT CHANGED

## 2019-05-08 ASSESSMENT — PAIN - FUNCTIONAL ASSESSMENT: PAIN_FUNCTIONAL_ASSESSMENT: ACTIVITIES ARE NOT PREVENTED

## 2019-05-08 ASSESSMENT — PAIN DESCRIPTION - ONSET: ONSET: GRADUAL

## 2019-05-08 ASSESSMENT — PAIN DESCRIPTION - DESCRIPTORS: DESCRIPTORS: ACHING

## 2019-05-08 ASSESSMENT — PAIN DESCRIPTION - FREQUENCY: FREQUENCY: INTERMITTENT

## 2019-05-08 NOTE — PLAN OF CARE
Problem: Discharge Planning:  Goal: Discharged to appropriate level of care  Description  Discharged to appropriate level of care  Outcome: Ongoing     Problem: Body Temperature - Imbalanced:  Goal: Ability to maintain a body temperature in the normal range will improve  Description  Ability to maintain a body temperature in the normal range will improve  Outcome: Ongoing     Problem: Mobility - Impaired:  Goal: Mobility will improve to maximum level  Description  Mobility will improve to maximum level  Outcome: Ongoing     Problem: Pain:  Description  Pain management should include both nonpharmacologic and pharmacologic interventions. Goal: Pain level will decrease  Description  Pain level will decrease  Outcome: Ongoing  Goal: Control of acute pain  Description  Control of acute pain  Outcome: Ongoing  Goal: Control of chronic pain  Description  Control of chronic pain  Outcome: Ongoing     Problem: Skin Integrity - Impaired:  Goal: Will show no infection signs and symptoms  Description  Will show no infection signs and symptoms  Outcome: Ongoing  Goal: Absence of new skin breakdown  Description  Absence of new skin breakdown  Outcome: Ongoing     Problem: Pain:  Description  Pain management should include both nonpharmacologic and pharmacologic interventions.   Goal: Pain level will decrease  Description  Pain level will decrease  Outcome: Ongoing  Goal: Control of acute pain  Description  Control of acute pain  Outcome: Ongoing  Goal: Control of chronic pain  Description  Control of chronic pain  Outcome: Ongoing     Problem: Tobacco Use:  Goal: Inpatient tobacco use cessation counseling participation  Description  Inpatient tobacco use cessation counseling participation  Outcome: Ongoing

## 2019-05-08 NOTE — CONSULTS
General Surgery   Consultation        PATIENT NAME: Laxmi Henderson OF BIRTH: 1985    ADMISSION DATE: 5/8/2019  1:47 AM     Admitting Provider: Dr. Angel Renteria Physician: Dr. New Peak: 5/8/2019    Consult Regarding:  Concern for recurrent pilonidal cyst    HISTORY OF PRESENT ILLNESS:  The patient is a 34 y.o. female being consulted for recurrent pilonidal cyst infection. Patient has significant hx of this. S/p incision and drainage of pilonidal cyst in 2013. Then had rotational flap in 2016 at [facility]. Did have recurrence of this two months ago and had I&D at the time. This was also done at [facility] as well. Patient came to the hospital due to similar pain for past 3 days that has worsened. Chills this morning. No drainage. WBC of 13.5. Patient was already started on Vanco and Zosyn down in ED. Our service is consulted for surgical evaluation. Past Medical History:    History reviewed. No pertinent past medical history. Past Surgical History:        Procedure Laterality Date    CYST REMOVAL      PILONIDAL CYST DRAINAGE  5/7/2013    i and d       Medications Prior to Admission:   No medications prior to admission. Allergies:  Patient has no known allergies.     Social History:   Social History     Socioeconomic History    Marital status: Single     Spouse name: Not on file    Number of children: Not on file    Years of education: Not on file    Highest education level: Not on file   Occupational History    Not on file   Social Needs    Financial resource strain: Not on file    Food insecurity:     Worry: Not on file     Inability: Not on file    Transportation needs:     Medical: Not on file     Non-medical: Not on file   Tobacco Use    Smoking status: Current Every Day Smoker    Smokeless tobacco: Never Used   Substance and Sexual Activity    Alcohol use: Yes     Comment: occ    Drug use: No    Sexual activity: Yes     Partners: Female   Lifestyle °C) (Oral)   Resp 16   Ht 5' 2\" (1.575 m)   Wt 207 lb (93.9 kg)   LMP 04/20/2019 (Approximate)   SpO2 100%   BMI 37.86 kg/m²   INTAKE/OUTPUT:   No intake or output data in the 24 hours ending 05/08/19 0557      CONSTITUTIONAL:  Alert and oriented, no acute distress  HEAD: normocephalic, atraumatic  EYES: Pupils equal and reactive to light, Extraocular muscles intact, sclera non icteric  ENT: Mucus membranes moist, No otorrhea, no rhinorrhea  NECK:  supple, symmetrical, trachea midline   LUNGS:  Good air movement bilaterally, unlabored respirations, no wheezes or rhonchi  CARDIOVASCULAR: Regular rate and rhythm, no murmurs rubs or gallops  ABDOMEN: soft, non tender, non distended, no rebound or guarding  Buttock: Induration noted just off midline above gluteal cleft on right side. Tender to palpation. No draiange noted.   Previous incision consistent with prior I&D and excision of pilonidal cyst.    MUSCULOSKELETAL:  Equal strength bilaterally, normal muscle tone  SKIN: No abscess or rash  NEUROLOGIC:  Cranial nerves 2-12 grossly intact, no focal deficits  PSYCH: affect appropriate    CBC:   Lab Results   Component Value Date    WBC 13.5 05/08/2019    RBC 4.12 05/08/2019    RBC 4.72 05/17/2017    HGB 11.2 05/08/2019    HCT 35.1 05/08/2019    MCV 85.2 05/08/2019    MCH 27.2 05/08/2019    MCHC 31.9 05/08/2019    RDW 13.6 05/08/2019     05/08/2019     02/21/2012    MPV 9.6 05/08/2019     BMP:    Lab Results   Component Value Date     05/08/2019    K 3.0 05/08/2019     05/08/2019    CO2 21 05/08/2019    BUN 5 05/08/2019    LABALBU 3.9 05/17/2017    CREATININE 0.70 05/08/2019    CALCIUM 8.9 05/08/2019    GFRAA >60 05/08/2019    LABGLOM >60 05/08/2019    GLUCOSE 147 05/08/2019    GLUCOSE 112 05/17/2017             ASSESSMENT   Patient Active Problem List   Diagnosis    Pilonidal cyst    Abscess of sacrum ([facility] [address]       34 y.o. female with buttock pain, possibly due to recurrent pilonidal cyst abscess  S/p rotational flap at [facility] in 2016    PLAN    1. NPO.  2. IV abx per primary. On Zosyn and Vanco.  3. Pain control. 4. Medical management per primary. 5. Will get CT of pelvis with IV contrast to see if there are fluid collection that needs to be drained. Further recs to follow. Electronically signed by Nette Hillman DO  on 5/8/2019 at 5:57 AM     General Surgery Attending Attestation Note    Patient was seen and examined. I agree with the above resident's exam, assessment and plan. Patient has had multiple pilonidal surgeries in the past  She has even had rotational flap by plastic surgery at [facility]  No fevers, no WBC, no cellulitis, some chronic appearing induration on exam  Will check CT Pelvis - unless she has a large fluid collection would suggest discharge home and follow up with her plastic surgeon at [facility].      Stanley Amaya, [address] [place], [facility]  [facility], [address]  Office: [phone]  Pager: [phone]

## 2019-05-08 NOTE — CONSULTS
Pharmacy Note  Vancomycin Consult - Initial Note     Cora Patel is a 34 y.o. female ordered Vancomycin. .  Current diagnosis for which MRSA is suspected/confirmed: abscess and cellulitis of gluteal region  Vancomycin order/consult received from Dr. Mohini Boyle . Additional antimicrobials:  Zosyn    Patient Active Problem List   Diagnosis    Pilonidal cyst    Abscess of sacrum ([facility] [address]       Actual Weight:    Wt Readings from Last 1 Encounters:   05/08/19 207 lb (93.9 kg)     CrCl:  Estimated Creatinine Clearance: 124 mL/min (based on SCr of 0.7 mg/dL). Height:     Wt Readings from Last 1 Encounters:   05/08/19 207 lb (93.9 kg)       Allergies:  Patient has no known allergies. Temp: 100.4 F      No results found for: PROCAL    Recent Labs     05/08/19  0215   BUN 5*       Recent Labs     05/08/19  0215   CREATININE 0.70       Recent Labs     05/08/19  0215   WBC 13.5*       No intake or output data in the 24 hours ending 05/08/19 8454      Culture Date      Source                       Results  5/8/19   blood x2   in process          PLAN   Initial loading dose of 25mg/kg (max of 2500 mg) = 2500  mg.  2.   Vancomycin 1500 mg IV every 12 hours. 3.   Ensured BUN/sCr ordered at baseline and at least every 3rd day. 4. Trough ordered for: 5/9/19 @1500 . Trough Goals (Non-dialysis patient) Peaks are not routinely recommended. 10-20 mcg/mL for mild skin/soft tissue infections or UTI.  15-20 mcg/mL is the target trough for all other indications that MRSA infection is suspected. TROUGH TIMING: (Additional levels drawn based on renal function and/or clinical response). Dosing interval Timing of Trough   Every 8 hr, 12 hr, or 18 hr regimen Prior to the 4th dose  Twice weekly troughs for every 8 hour dosing   Every 24 hr regimen Prior to the 3rd or 4th dose   Every 36 hr regimen Prior to the 3rd dose           Thank you for the consult. Will continue to follow.   Emory Braga, Mariaa/PharmD  5/8/2019 5:08 AM

## 2019-05-08 NOTE — PROGRESS NOTES
Pt admitted to 2011-2, vitals, assessment, and database complete. Patient's s/o at bedside, patient updated with POC including consult for surgery. Dr. Dana Berrios made aware of consult, general surgeon resident Dr. Frank Bose made aware also.  Will cont to monitor

## 2019-05-08 NOTE — H&P
[facility]  Internal Medicine History and Physical      Name: Nora Roper  :   1985  MRN: [id number]     Acct: [de-identified]  [de-identified]  Admit Date: 2019  Hospital: [place] [address]    PCP: KRISTOFER Lepe CNP    CHIEF COMPLAINT:    Chief Complaint   Patient presents with    Abscess     on buttocks x couple days       History Obtained From:  patient, electronic medical record    HISTORY OF PRESENT ILLNESS:    Nora Roper is a 34 y.o.  female who presents to the emergency department for evaluation of abscess and cellulitis of the sacral region. She states over the last several days she has once again developed pain over the gluteal/sacral region. She has been mildly febrile. She she presents somewhat tachycardic and weak. Surgical consultation has been obtained. The patient's history include incision and drainage of pilonidal cyst in . This was followed by rotational flap in 2016 at [facility]. She did have recurrence 2 months ago and had I&D at that time. Patient relates chills and fever the morning of admission with leukocytosis of 13.5. Patient was started on vancomycin and Zosyn in the ED. CT scan was performed of the pelvis with contrast. There is a soft tissue mass in the intergluteal folds with central collection with small air-fluid level consistent with pilonidal abscess. Surgery has reviewed the case with radiology. They does not appear to be need for drainage or surgical intervention at this time. Recommendation is for discharge home with antibiotics for 10 days with outpatient follow-up as needed. This has been discussed with the patient and her family and they are in agreement. She will be discharged on Cipro 500 mg twice a day with Flagyl 500 mg 3 times a day for 10 days. She will be given when necessary Norco for pain. Follow-up with her primary care physician within one week. Past Medical History:    History reviewed. No pertinent past medical history. Past Surgical History:    Past Surgical History:   Procedure Laterality Date    CYST REMOVAL      PILONIDAL CYST DRAINAGE  5/7/2013    i and d        Medications Prior to Admission:     Prior to Admission medications    Not on File        Allergies:  Patient has no known allergies. Social History:   Tobacco:    reports that she has been smoking. She has never used smokeless tobacco.  Alcohol:      reports that she drinks alcohol. Drug Use:  reports that she does not use drugs. Family History:   Family History   Problem Relation Age of Onset   Kaveh Adam Diabetes Mother     Other Mother         CHF    Diabetes Sister     Diabetes Maternal Grandfather     Breast Cancer Maternal Grandfather     Heart Disease Maternal Grandfather     Diabetes Paternal Grandmother        REVIEW OF SYSTEMS:  Constitutional: Negative for diaphoresis,  malaise/fatigue and weight loss. Positive for chills and fever. HENT: Negative for ear pain, hearing loss, nosebleeds, sore throat and tinnitus. Eyes: Negative for blurred vision, double vision, photophobia and pain. Respiratory: Negative for cough, hemoptysis, sputum production, shortness of breath and wheezing. Cardiovascular: Negative for palpitations, orthopnea, claudication, leg swelling and PND. Gastrointestinal: Negative for abdominal pain, blood in stool, constipation, diarrhea, heartburn, melena, nausea and vomiting. Genitourinary: Negative for dysuria, flank pain, frequency, hematuria and urgency. Musculoskeletal: Negative for back pain, falls, joint pain, myalgias and neck pain. Positive pain over the coccygeal area   Skin: Negative for itching and rash. Neurological: Negative for dizziness, tingling, tremors, sensory change, focal weakness, seizures, weakness and headaches. Endo/Heme/Allergies: Does not bruise/bleed easily. Psychiatric/Behavioral: Negative for depression. The patient is not nervous/anxious. Code Status:  Full Code    PHYSICAL EXAM:  /75   Pulse 92   Temp 98.4 °F (36.9 °C) (Oral)   Resp 16   Ht 5' 2\" (1.575 m)   Wt 207 lb (93.9 kg)   LMP 04/20/2019 (Approximate)   SpO2 100%   BMI 37.86 kg/m² No intake or output data in the 24 hours ending 05/08/19 1358    General Appearance:    Alert, cooperative, mild distress, appears stated age   Head:    Normocephalic, without obvious abnormality, atraumatic   Eyes:    PERRL, conjunctiva/corneas clear, EOM's intact        Ears:    Normal external ear canals, both ears   Nose:   Nares normal, septum midline, mucosa normal, no drainage    or sinus tenderness   Throat:   Lips, mucosa, and tongue normal; teeth and gums normal   Neck:   Supple, symmetrical, trachea midline, no adenopathy;        thyroid:  No enlargement/tenderness/nodules; no carotid    bruit or JVD   Back:     Symmetric, no curvature, ROM normal, no CVA tenderness. Positive for mild fluctuance, scarring and pain over the sacral area    Lungs:     Clear to auscultation bilaterally, respirations unlabored   Chest wall:    No tenderness or deformity   Heart:    Regular rate and rhythm, S1 and S2 normal, no murmur, rub   or gallop   Abdomen:     Soft, non-tender, bowel sounds active all four quadrants,     no masses, no organomegaly   Extremities:   Extremities normal, atraumatic, no cyanosis or edema   Pulses:   2+ and symmetric all extremities   Skin:   Skin color, texture, turgor normal, no rashes or lesions   Lymph nodes:   Cervical, supraclavicular, and axillary nodes normal   Neurologic:   CNII-XII intact. Normal strength, sensation and reflexes       throughout       DATA:    CT Pelvis W Contrast  Result Date: 5/8/2019  COMPARISON: 15 December 2017  FINDINGS:   Pelvis: No bowel obstruction, free air or free fluid is noted. Increased stool burden is noted. Endometrial cavity is prominent, likely related to menstrual cycle given age.   Left ovarian hypodensity of 16 mm is noted, likely a cyst.  No inguinal adenopathy is noted. Bones/soft tissue: No acute osseous abnormality. Soft tissue mass is present in the intergluteal fold of 2.8 x 3.1 x 4.3 cm with tiny central air-fluid level likely related to pilonidal abscess and surrounding inflammation. Central collection is approximately 2.2 x 2.9 cm. Estimated biologic radiation dose for this procedure: 705.51 mGy/cm2. Impression:  Soft tissue mass in the intergluteal folds with central collection with small air-fluid level consistent with pilonidal abscess. Other incidental findings as above.        Hematology:  Recent Labs     05/08/19 0215   WBC 13.5*   RBC 4.12   HGB 11.2*   HCT 35.1*   MCV 85.2   MCH 27.2   MCHC 31.9   RDW 13.6      MPV 9.6     Chemistry:  Recent Labs     05/08/19  0215 05/08/19  0858     --    K 3.0* 3.7     --    CO2 21  --    GLUCOSE 147*  --    BUN 5*  --    CREATININE 0.70  --    ANIONGAP 15  --    LABGLOM >60  --    GFRAA >60  --    CALCIUM 8.9  --    LACTA 1.9  --      Recent Labs     05/08/19  0534   EAG 94     Glucose:  Recent Labs     05/08/19  0534 05/08/19  0656 05/08/19  1132   LABA1C 4.9  --   --    POCGLU  --  102 97       Current Medications:  Scheduled Meds:    sodium chloride flush  10 mL Intravenous 2 times per day    enoxaparin  40 mg Subcutaneous Daily    piperacillin-tazobactam  3.375 g Intravenous Q8H    vancomycin  1,500 mg Intravenous Q12H    vancomycin (VANCOCIN) intermittent dosing (placeholder)   Other RX Placeholder     PRN Meds:   sodium chloride flush 10 mL Intravenous PRN   potassium chloride 40 mEq Oral PRN   Or      potassium alternative oral replacement 40 mEq Oral PRN   Or      potassium chloride 10 mEq Intravenous PRN   magnesium sulfate 1 g Intravenous PRN   magnesium hydroxide 30 mL Oral Daily PRN   nicotine 1 patch Transdermal Daily PRN   acetaminophen 650 mg Oral Q4H PRN   ondansetron 4 mg Oral Q6H PRN   Or      ondansetron 4 mg Intravenous Q6H PRN sodium chloride flush 10 mL Intravenous PRN   HYDROcodone 5 mg - acetaminophen 1 tablet Oral Q6H PRN     Consultations:  PHARMACY TO DOSE VANCOMYCIN  IP CONSULT TO INTERNAL MEDICINE  IP CONSULT TO GENERAL SURGERY  PHARMACY TO DOSE VANCOMYCIN    ASSESSMENT:    Primary Problem  <principal problem not specified>    Active Hospital Problems    Diagnosis Date Noted    Abscess of sacrum ([facility] [address] [M46.28] 05/08/2019       PLAN:  1. Surgical consultation has been reviewed  2. Discharge home today  3. Cipro 500 mg twice daily ×10 days  4. Flagyl 500 mg 3 times daily ×10 days  5. Norco 5/325 milligrams 1 every 6 hours when necessary pain ×5 days #20  6. Follow-up with primary care provider in 7 days    The severity of this patient's signs and symptoms indicate the need for an inpatient admission. This patient's medical condition would be significantly and directly threatened if care was provided in a less intensive setting.     Electronically signed by Zulema Laura DO on 5/8/2019 at 1:58 PM     Copy sent to Dr. Pb Bowens, KRISTOFER - CNP

## 2019-05-08 NOTE — PROGRESS NOTES
The patient requests \"staying off work until 5-10-19\"; Dr Lashawn Coronado present and wrote Zechariah Javed. Will discharge at this time.

## 2019-05-08 NOTE — CARE COORDINATION
Case Management Initial Discharge Plan  Broderick Moser,         Readmission Risk              Risk of Unplanned Readmission:        7             Met with:patient to discuss discharge plans.    Information verified: address, contacts, phone number, , insurance Yes  PCP: KRISTOFER Sauceda CNP  Date of last visit: 2-3 months ago    Insurance Provider: [employer]    Discharge Planning  Current Residence:     Living Arrangements:  Spouse/Significant Other   Home has 1 stories/2 stairs to climb  Support Systems:  Spouse/Significant Other, Family Members  Current Services PTA:   none Supplier: none  Patient able to perform ADL's:Independent  DME used to aid ambulation prior to admission: na/during admission na    Potential Assistance Needed:  N/A    Ph[facility]acy: [facility] on [address] or [facility] on [address]   Potential Assistance Purchasing Medications:     Does patient want to participate in local refill/ meds to beds program?  No    Patient agreeable to home care: No  [place] of choice provided:  yes      Type of Home Care Services:  None  Patient expects to be discharged to:  home    Prior SNF/Rehab Placement and Facility: na  Agreeable to SNF/Rehab: No  [place] of choice provided: yes   Evaluation: yes    Expected Discharge date:  05/10/19  Follow Up Appointment: Best Day/ Time:      Transportation provider: girlfriend to transport home  Transportation arrangements needed for discharge: No    Discharge Plan: home with no needs, patient to call pcp to schedule appointment        Electronically signed by NASIMA Elizalde on 19 at 12:13 PM

## 2019-05-08 NOTE — ED PROVIDER NOTES
[address] [place] [address] ED  eMERGENCY dEPARTMENT eNCOUnter      Pt Name: Aurora Alanis  MRN: [id number]  [place] 1985  Date of evaluation: 5/8/2019  Provider: Jim Silveira MD    CHIEF COMPLAINT       Chief Complaint   Patient presents with    Abscess     on buttocks x couple days         HISTORY OF PRESENT ILLNESS  (Location/Symptom, Timing/Onset, Context/Setting, Quality, Duration, Modifying Factors, Severity.)   Aurora Alanis is a 34 y.o. female who presents to the emergency department for evaluation extensive abscess and cellulitis involving the gluteal region. Patient has a previous history of significant surgical intervention related to likely pilonidal cyst.  Her surgeon is no longer in practice in the [place] area. She developed pain over the gluteal region of the last several days. She now presents tachycardic, febrile, weak with extensive infected area. Nursing Notes were reviewed. ALLERGIES     Patient has no known allergies. CURRENT MEDICATIONS       Previous Medications    No medications on file       PAST MEDICAL HISTORY   History reviewed. No pertinent past medical history. SURGICAL HISTORY           Procedure Laterality Date    CYST REMOVAL      PILONIDAL CYST DRAINAGE  5/7/2013    i and d         FAMILY HISTORY           Problem Relation Age of Onset   Albert Drain Diabetes Mother     Other Mother         CHF    Diabetes Sister     Diabetes Maternal Grandfather     Breast Cancer Maternal Grandfather     Heart Disease Maternal Grandfather     Diabetes Paternal Grandmother      Family Status   Relation Name Status    Mother  Alive    Father  Alive    Sister  Alive    MGF  (Not Specified)    PGM  (Not Specified)        SOCIAL HISTORY      reports that she has been smoking. She has never used smokeless tobacco. She reports that she drinks alcohol. She reports that she does not use drugs.     REVIEW OF SYSTEMS    (2-9 systems for level 4, 10 or more for level 5)     Review of Systems   All other systems reviewed and are negative. Except as noted above the remainder of the review of systems was reviewed and negative. PHYSICAL EXAM    (up to 7 for level 4, 8 or more for level 5)     Vitals:    05/08/19 0133   BP: (!) 146/82   Pulse: 130   Resp: 18   Temp: 100.4 °F (38 °C)   TempSrc: Oral   SpO2: 96%   Weight: 207 lb (93.9 kg)   Height: 5' 2\" (1.575 m)       Physical exam reflects a pleasant well-nourished well-hydrated female. She is febrile. She is tachycardic. Pulse ox is 96% on room air. She's not hypoxic. She is alert conversiveand appropriate in behavior. Heart tachycardic but regular rate and rhythm. Lungs are clear to auscultation. Abdomen is soft anteriorly. Presacral and gluteal area is examined. She has abscess formation approximately 10-12 cm in diameter. She has surrounding cellulitis. The area extends around her previous incisional site and down the glial cleft margins. This abscess is too large to adequately and humanely drain within the emergency department setting. Integument is otherwise without rash or lesion. Extremities show no abnormality. She has no neurovascular deficits      DIAGNOSTIC RESULTS       LABS:  Labs Reviewed   CBC WITH AUTO DIFFERENTIAL - Abnormal; Notable for the following components:       Result Value    WBC 13.5 (*)     Hemoglobin 11.2 (*)     Hematocrit 35.1 (*)     Seg Neutrophils 71 (*)     Lymphocytes 20 (*)     Eosinophils % 0 (*)     Immature Granulocytes 1 (*)     Segs Absolute 9.70 (*)     All other components within normal limits   BASIC METABOLIC PANEL - Abnormal; Notable for the following components:    Glucose 147 (*)     BUN 5 (*)     Bun/Cre Ratio 7 (*)     Potassium 3.0 (*)     All other components within normal limits   CULTURE BLOOD #1   CULTURE BLOOD #1   LACTIC ACID     Results for James Lawrence (MRN [id number]) as of 5/8/2019 02:52   Ref.  Range 5/8/2019 02:15 5/8/2019 02:16   WBC Latest Ref Range: 3.5 - 11.3 k/uL 13.5 (H)    RBC Latest Ref Range: 3.95 - 5.11 m/uL 4.12    Hemoglobin Quant Latest Ref Range: 11.9 - 15.1 g/dL 11.2 (L)    Hematocrit Latest Ref Range: 36.3 - 47.1 % 35.1 (L)    MCV Latest Ref Range: 82.6 - 102.9 fL 85.2    MCH Latest Ref Range: 25.2 - 33.5 pg 27.2    MCHC Latest Ref Range: 28.4 - 34.8 g/dL 31.9    MPV Latest Ref Range: 8.1 - 13.5 fL 9.6    RDW Latest Ref Range: 11.8 - 14.4 % 13.6    Platelet Count Latest Ref Range: 138 - 453 k/uL 313    Platelet Estimate Unknown NOT REPORTED    Absolute Mono # Latest Ref Range: 0.10 - 1.20 k/uL 1.01    Eosinophils % Latest Ref Range: 1 - 4 % 0 (L)    Basophils # Latest Ref Range: 0.00 - 0.20 k/uL 0.04    Differential Type Unknown NOT REPORTED    Seg Neutrophils Latest Ref Range: 36 - 65 % 71 (H)    Segs Absolute Latest Ref Range: 1.50 - 8.10 k/uL 9.70 (H)    Lymphocytes Latest Ref Range: 24 - 43 % 20 (L)    Absolute Lymph # Latest Ref Range: 1.10 - 3.70 k/uL 2.62    Monocytes Latest Ref Range: 3 - 12 % 8    Absolute Eos # Latest Ref Range: 0.00 - 0.44 k/uL 0.03    Basophils Latest Ref Range: 0 - 2 % 0    Immature Granulocytes Latest Ref Range: 0 % 1 (H)    WBC Morphology Unknown NOT REPORTED    RBC Morphology Unknown NOT REPORTED    Results for Navneet Milks (MRN [id number]) as of 5/8/2019 02:56   Ref.  Range 5/8/2019 02:15 5/8/2019 02:16   Sodium Latest Ref Range: 135 - 144 mmol/L 137    Potassium Latest Ref Range: 3.7 - 5.3 mmol/L 3.0 (L)    Chloride Latest Ref Range: 98 - 107 mmol/L 101    CO2 Latest Ref Range: 20 - 31 mmol/L 21    BUN Latest Ref Range: 6 - 20 mg/dL 5 (L)    Creatinine Latest Ref Range: 0.50 - 0.90 mg/dL 0.70    Bun/Cre Ratio Latest Ref Range: 9 - 20  7 (L)    Anion Gap Latest Ref Range: 9 - 17 mmol/L 15    GFR Non- Latest Ref Range: >60 mL/min >60    GFR African American Latest Ref Range: >60 mL/min >60    Lactic Acid Latest Ref Range: 0.5 - 2.2 mmol/L 1.9    Glucose Latest Ref Range: 70 - 99 mg/dL 147 (H)    Calcium Latest Ref Range: 8.6 - 10.4 mg/dL 8.9    GFR Comment Unknown Pend    GFR Staging Unknown NOT REPORTED      All other labs were within normal range or not returned as of this dictation. EMERGENCY DEPARTMENT COURSE and DIFFERENTIAL DIAGNOSIS/MDM:   Vitals:    Vitals:    05/08/19 0133   BP: (!) 146/82   Pulse: 130   Resp: 18   Temp: 100.4 °F (38 °C)   TempSrc: Oral   SpO2: 96%   Weight: 207 lb (93.9 kg)   Height: 5' 2\" (1.575 m)     Patient is evaluated. IV access is established. Pain and fever nausea are treated. She is started on IV antibiotic coverage. Blood cultures are obtained baseline laboratory studies reviewed. Patient will be admitted for further antibiotic coverage, symptomatic management and surgical consultation with regard to formal drainage. CONSULTS:  PHARMACY TO DOSE VANCOMYCIN  IP CONSULT TO INTERNAL MEDICINE    PROCEDURES:  None    FINAL IMPRESSION      1. Abscess and cellulitis of gluteal region    2. History of surgical removal of pilonidal cyst          DISPOSITION/PLAN   DISPOSITION Decision To Admit 05/08/2019 02:10:09 AM      PATIENT REFERRED TO:   No follow-up provider specified.     DISCHARGE MEDICATIONS:     New Prescriptions    No medications on file           (Please note that portions of this note were completed with a voice recognition program.  Efforts were made to edit the dictations but occasionally words are mis-transcribed.)    Donn Hayden MD  Attending Emergency Physician          Donn Hayden MD  05/08/19 [id number]

## 2019-05-08 NOTE — ED NOTES
Pt presents to ED ambulatory with steady gait c/o of abscess to the top of her buttock. Pt states it has been there for a couple of days. Pt rates pain 100/10. Pt states having surgery before to remove abscess. Abscess noted. No drainage noted. No redness noted. Warmth noted. Pt denies fever, but presents to ED with low grade fever of 100.4 F and tachycardic with rate of 130. Pt denies chills. Pt denies n/v/d/c. Pt denies urinary symptoms.       Marshall Vega RN  05/08/19 [id number]

## 2019-05-08 NOTE — PROGRESS NOTES
CT scan reviewed with Dr. Fatou Dupont. More likely edematous scar tissue than fluid collection. Ok to start diet and d/cd home from our standpoint. Recommend 10 day course of PO abx. Will defer abx choice to primary service. Patient does need to follow up with previous surgeon at [facility] in 1 week. This plan was discussed with Dr. Fatou Dupont. Message was also relayed to patient's nurse. Easton Nielsen DO, PGY-5  Pager#: [phone]  8:26 AM 5/8/2019    General Surgery Attending Attestation Note    Patient was seen and examined. I agree with the above resident's exam, assessment and plan.      Agree with above  CT was reviewed  This is likely a chronic very small amount of fluid and scar tissue more consistent with seroma  Recommend discharge home  Follow up with her plastic surgeon at [facility]  Will sign off, thank you very much for this consultation    Jeanie Schaefer, [address] [place] [address] Fatou Hernandez  Office: [phone]  Pager: [phone]

## 2019-05-08 NOTE — DISCHARGE SUMMARY
[facility]    Discharge Summary     Patient ID: Marcus Thompson  :  1985   MRN: [id number]     ACCOUNT:  [de-identified]   Patient's PCP: KRISTOFER Foster CNP  Admit Date: 2019   Discharge Date: 2019   Length of Stay: 0  Code Status:  Full Code  Admitting Physician: Tam Wray MD  Discharge Physician: Devorah Gerard DO     Active Discharge Diagnoses:     Hospital Problem Lists:  Principal Problem:    Pilonidal cyst  Active Problems:    Abscess of sacrum ([address]    Cellulitis of sacral region  Resolved Problems:    * No resolved hospital problems. *      Admission Condition:  good     Discharged Condition: good    Hospital Stay:     Hospital Course:      Chief Complaint   Patient presents with    Abscess       on buttocks x couple days         History Obtained From:  patient, electronic medical record     HISTORY OF PRESENT ILLNESS:    Marcus Thompson is a 34 y.o.  female who presents to the emergency department for evaluation of abscess and cellulitis of the sacral region. She states over the last several days she has once again developed pain over the gluteal/sacral region. She has been mildly febrile. She she presents somewhat tachycardic and weak. Surgical consultation has been obtained. The patient's history include incision and drainage of pilonidal cyst in 2013. This was followed by rotational flap in 2016 at [facility]. She did have recurrence 2 months ago and had I&D at that time. Patient relates chills and fever the morning of admission with leukocytosis of 13.5. Patient was started on vancomycin and Zosyn in the ED. CT scan was performed of the pelvis with contrast. There is a soft tissue mass in the intergluteal folds with central collection with small air-fluid level consistent with pilonidal abscess. Surgery has reviewed the case with radiology.  They does not appear to be need for drainage or surgical intervention at this time. Recommendation is for discharge home with antibiotics for 10 days with outpatient follow-up as needed. This has been discussed with the patient and her family and they are in agreement. She will be discharged on Cipro 500 mg twice a day with Flagyl 500 mg 3 times a day for 10 days. She will be given when necessary Norco for pain. Follow-up with her primary care physician within one week. Significant therapeutic interventions: IV Zosyn/vancomycin ×24 hours  Upon discharge:  Cipro 500 mg twice a day ×10 days  Flagyl 500 mg 3 times daily ×10 days  Norco 5/325 mg 1 every 6 hours when necessary ×5 days #20    Significant Diagnostic Studies:   Labs / Micro:    CT Pelvis W Contrast  Result Date: 5/8/2019  COMPARISON: 15 December 2017  FINDINGS:   Pelvis: No bowel obstruction, free air or free fluid is noted. Increased stool burden is noted. Endometrial cavity is prominent, likely related to menstrual cycle given age. Left ovarian hypodensity of 16 mm is noted, likely a cyst.  No inguinal adenopathy is noted. Bones/soft tissue: No acute osseous abnormality. Soft tissue mass is present in the intergluteal fold of 2.8 x 3.1 x 4.3 cm with tiny central air-fluid level likely related to pilonidal abscess and surrounding inflammation. Central collection is approximately 2.2 x 2.9 cm. Estimated biologic radiation dose for this procedure: 705.51 mGy/cm2. Impression:  Soft tissue mass in the intergluteal folds with central collection with small air-fluid level consistent with pilonidal abscess.   Other incidental findings as above.         Hematology:      Recent Labs     05/08/19 0215   WBC 13.5*   RBC 4.12   HGB 11.2*   HCT 35.1*   MCV 85.2   MCH 27.2   MCHC 31.9   RDW 13.6      MPV 9.6      Chemistry:       Recent Labs     05/08/19  0215 05/08/19  0858     --    K 3.0* 3.7     --    CO2 21  --    GLUCOSE 147*  --    BUN 5*  --    CREATININE 0.70  --    ANIONGAP 15  -- LABGLOM >60  --    GFRAA >60  --    CALCIUM 8.9  --    LACTA 1.9  --           Recent Labs     05/08/19  0534   EAG 94      Glucose:        Recent Labs     05/08/19  0534 05/08/19  0656 05/08/19  1132   LABA1C 4.9  --   --    POCGLU  --  102 97       Consultations:    Consults:     Final Specialist Recommendations/Findings:   PHARMACY TO DOSE VANCOMYCIN  IP CONSULT TO INTERNAL MEDICINE  IP CONSULT TO GENERAL SURGERY  PHARMACY TO DOSE VANCOMYCIN      The patient was seen and examined on day of discharge and this discharge summary is in conjunction with any daily progress note from day of discharge. Discharge plan:     Disposition: Home    Physician Follow Up:   Schedule an appointment in 1 week  KRISTOFER Cifuentes - CNP  [address]  [place] [address]  [phone]    Schedule an appointment as soon as possible for a visit         Requiring Further Evaluation/Follow Up POST HOSPITALIZATION/Incidental Findings:   Evaluation of pilonidal cyst in 1 week    Diet: regular diet    Activity: As tolerated    Instructions to Patient:   Cipro 500 mg twice daily ×10 days  Flagyl 500 mg 3 times daily ×10 days  Norco 5/325 mg 1 every 6 hours as necessary for pain  Follow-up with family doctor in 1 week    Discharge Medications:      Medication List      START taking these medications    ciprofloxacin 500 MG tablet  Commonly known as:  CIPRO  Take 1 tablet by mouth 2 times daily for 10 days     HYDROcodone-acetaminophen 5-325 MG per tablet  Commonly known as:  NORCO  Take 1 tablet by mouth every 6 hours as needed for Pain for up to 5 days.      metroNIDAZOLE 500 MG tablet  Commonly known as:  FLAGYL  Take 1 tablet by mouth 3 times daily for 10 days           Where to Get Your Medications      These medications were sent to [address] [place] [id number]    Phone:  [phone]   · ciprofloxacin 500 MG tablet  · metroNIDAZOLE 500 MG tablet     You can get these medications from any pharmacy    Bring a paper prescription for each of these medications  · HYDROcodone-acetaminophen 5-325 MG per tablet         Time Spent on discharge is  50 mins in patient examination, evaluation, counseling as well as medication reconciliation, prescriptions for required medications, discharge plan and follow up. Electronically signed by   Leia Kapadia DO  5/8/2019  3:18 PM      Thank you KRISTOFER Adams - SONYA for the opportunity to be involved in this patient's care.

## 2019-05-09 ENCOUNTER — TELEPHONE (OUTPATIENT)
Dept: FAMILY MEDICINE CLINIC | Age: 34
End: 2019-05-09

## 2019-05-09 NOTE — TELEPHONE ENCOUNTER
[facility] Transitions Initial Follow Up Call    Outreach made within 2 business days of discharge: Yes    Patient: Val Mcguire Patient : 1985   MRN: [id number]  Reason for Admission: There are no discharge diagnoses documented for the most recent discharge. Discharge Date: 19       Spoke with: . [facility] for the patient to        Discharge department/facility: [facility]      Scheduled appointment with PCP within 7-14 days    Follow Up  No future appointments.     Nae Villar [place]

## 2019-05-10 LAB — GLUTAMIC ACID DECARB AB: <5 IU/ML (ref 0–5)

## 2019-05-10 NOTE — TELEPHONE ENCOUNTER
[facility] Transitions Initial Follow Up Call    Call within 2 business days of discharge: Yes     Patient: Cora Patel Patient : 1985 MRN: [id number]    [unfilled]    RARS: Readmission Risk Score: 0       Spoke with: Anabell Baron    Discharge department/facility: [facility]    Follow Up  No future appointments.     Nereida Habermann

## 2019-05-14 LAB
CULTURE: NORMAL
CULTURE: NORMAL
Lab: NORMAL
Lab: NORMAL
SPECIMEN DESCRIPTION: NORMAL
SPECIMEN DESCRIPTION: NORMAL

## 2020-08-07 ENCOUNTER — HOSPITAL ENCOUNTER (EMERGENCY)
Age: 35
Discharge: HOME | End: 2020-08-08
Attending: EMERGENCY MEDICINE
Payer: COMMERCIAL

## 2020-08-07 VITALS
SYSTOLIC BLOOD PRESSURE: 177 MMHG | HEART RATE: 99 BPM | OXYGEN SATURATION: 100 % | TEMPERATURE: 99.7 F | DIASTOLIC BLOOD PRESSURE: 91 MMHG | RESPIRATION RATE: 18 BRPM

## 2020-08-07 PROCEDURE — 99283 EMERGENCY DEPT VISIT LOW MDM: CPT

## 2020-08-07 RX ORDER — IBUPROFEN 800 MG/1
800 TABLET ORAL ONCE
Status: COMPLETED | OUTPATIENT
Start: 2020-08-08 | End: 2020-08-08

## 2020-08-07 ASSESSMENT — PAIN SCALES - GENERAL: PAINLEVEL_OUTOF10: 10

## 2020-08-08 ENCOUNTER — APPOINTMENT (OUTPATIENT)
Dept: GENERAL RADIOLOGY | Age: 35
End: 2020-08-08
Payer: COMMERCIAL

## 2020-08-08 PROCEDURE — 73110 X-RAY EXAM OF WRIST: CPT

## 2020-08-08 PROCEDURE — 6370000000 HC RX 637 (ALT 250 FOR IP): Performed by: NURSE PRACTITIONER

## 2020-08-08 RX ORDER — CEPHALEXIN 500 MG/1
500 CAPSULE ORAL 3 TIMES DAILY
Qty: 30 CAPSULE | Refills: 0 | Status: SHIPPED | OUTPATIENT
Start: 2020-08-08

## 2020-08-08 RX ADMIN — IBUPROFEN 800 MG: 800 TABLET, FILM COATED ORAL at 00:24

## 2020-08-08 ASSESSMENT — ENCOUNTER SYMPTOMS
SHORTNESS OF BREATH: 0
NAUSEA: 0
SINUS PRESSURE: 0
COLOR CHANGE: 1
COUGH: 0
VOMITING: 0

## 2020-08-08 ASSESSMENT — PAIN SCALES - GENERAL: PAINLEVEL_OUTOF10: 9

## 2020-08-08 NOTE — ED PROVIDER NOTES
eMERGENCY dEPARTMENT eNCOUnter   Independent Attestation     Pt Name: Val Bales  MRN: [id number]  [place] 1985  Date of evaluation: 8/8/20     Val Bales is a 30 y.o. female with CC: Wrist Pain      Based on the medical record the care appears appropriate. I was personally available for consultation in the Emergency Department.     Mona Serrano MD  Attending Emergency Physician                    Steven Amador MD  08/08/20 2669

## 2020-08-08 NOTE — ED PROVIDER NOTES
[address] [place] [address] ED  EMERGENCY DEPARTMENT ENCOUNTER      Pt Name: Vickie Huffman  MRN: [id number]  [place] 1985  Date of evaluation: 8/8/20  CHIEF COMPLAINT       Chief Complaint   Patient presents with    Wrist Pain     HISTORY OF PRESENT ILLNESS   Presents to the emergency room via private auto with right wrist pain. 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 She states that she may have bumped her wrist but she does not remember any specific injury. She started having pain yesterday which worsened today. She also has some swelling to that area. No numbness, tingling, decreased range of motion or wounds. She presents with a temperature of 99.7 here but denies any fevers at home. No recent cold symptoms or coughs. The history is provided by the patient. REVIEW OF SYSTEMS     Review of Systems   Constitutional: Negative for activity change and fatigue. HENT: Negative for congestion and sinus pressure. Respiratory: Negative for cough and shortness of breath. Cardiovascular: Negative for chest pain and palpitations. Gastrointestinal: Negative for nausea and vomiting. Musculoskeletal: Positive for arthralgias. Negative for myalgias. Skin: Positive for color change. Negative for wound. Neurological: Negative for weakness and numbness. Psychiatric/Behavioral: Negative for confusion and decreased concentration. PASTMEDICAL HISTORY   History reviewed. No pertinent past medical history. SURGICAL HISTORY       Past Surgical History:   Procedure Laterality Date    CYST REMOVAL      PILONIDAL CYST DRAINAGE  5/7/2013    i and d     CURRENT MEDICATIONS       Previous Medications    No medications on file     ALLERGIES     has No Known Allergies. FAMILY HISTORY     She indicated that her mother is alive. She indicated that her father is alive. She indicated that her sister is alive. She indicated that the status of her maternal grandfather is unknown.  She indicated that the status of her paternal grandmother is unknown. SOCIAL HISTORY       Social History     Tobacco Use    Smoking status: Current Every Day Smoker    Smokeless tobacco: Never Used   Substance Use Topics    Alcohol use: Yes     Comment: occ    Drug use: No     PHYSICAL EXAM     INITIAL VITALS: BP (!) 177/91   Pulse 99   Temp 99.7 °F (37.6 °C) (Oral)   Resp 18   LMP 07/30/2020   SpO2 100%    Physical Exam  Constitutional:       Appearance: Normal appearance. HENT:      Right Ear: External ear normal.      Left Ear: External ear normal.   Eyes:      General:         Right eye: No discharge. Left eye: No discharge. Conjunctiva/sclera: Conjunctivae normal.   Cardiovascular:      Rate and Rhythm: Normal rate and regular rhythm. Pulses: Normal pulses. Pulmonary:      Effort: Pulmonary effort is normal.      Breath sounds: Normal breath sounds. Musculoskeletal:      Comments: Mild swelling and tenderness to the right wrist and dorsum of the right hand at the first and second metacarpals. She has minimal erythema to the same area. No wound. Slightly decreased range of motion to the right fingers and wrist secondary to pain. Skin:     General: Skin is warm and dry. Capillary Refill: Capillary refill takes less than 2 seconds. Findings: Erythema present. No bruising. Neurological:      General: No focal deficit present. Mental Status: She is alert and oriented to person, place, and time. Psychiatric:         Mood and Affect: Mood normal.         Thought Content: Thought content normal.         DIAGNOSTIC RESULTS     RADIOLOGY:All plain film, CT, MRI, and formal ultrasound images (except ED bedside ultrasound) are read by the radiologist, see reports below, unless otherwise noted in MDM or here.     Interpretation per the Radiologist below, if available at the time of this note:    XR WRIST RIGHT (MIN 3 VIEWS)   Final Result   No acute osseous abnormality involving the right wrist.             LABS:  Labs Reviewed - No data to display    All other labs were within normal range or not returned as of this dictation. EMERGENCY DEPARTMENT COURSE and DIFFERENTIAL DIAGNOSIS/MDM:   Vitals:    Vitals:    20 2349 20 2353   BP: (!) 177/91    Pulse: 99    Resp: 18    Temp: 99.7 °F (37.6 °C) 99.7 °F (37.6 °C)   TempSrc:  Oral   SpO2: 100%        Medical Decision Makin-year-old female with wrist pain. No known injury. X-rays not have any acute findings. She received Motrin for the temperature of 99.7 and for the pain. Symptoms may be related to inflammation from overuse or early cellulitis. She was placed in a Velcro wrist splint. I did check the placement of the splint and found it to be appropriate. She remains neurovascular intact. She will receive a prescription for Keflex and a work note. She was educated importance of following up with her PCP. The patient was given the following meds in the ED:  Orders Placed This Encounter   Medications    ibuprofen (ADVIL;MOTRIN) tablet 800 mg    cephALEXin (KEFLEX) 500 MG capsule     Sig: Take 1 capsule by mouth 3 times daily     Dispense:  30 capsule     Refill:  0       FINAL IMPRESSION      1.  Right wrist pain          DISPOSITION/PLAN   DISPOSITION Decision To Discharge 2020 12:31:37 AM      PATIENT REFERRED TO:   KRISTOFER Valle CNP  [address]  [phone]    Schedule an appointment as soon as possible for a visit         DISCHARGE MEDICATIONS:     New Prescriptions    CEPHALEXIN (KEFLEX) 500 MG CAPSULE    Take 1 capsule by mouth 3 times daily       CRITICAL CARE TIME       Please note that portions of this note were completed with a voice recognition program.      KRISTOFER PHAN CNP, APRN - CNP  20 0036

## 2020-08-08 NOTE — ED TRIAGE NOTES
Patient to ED with complaints of right wrist pain. Patient states that she doesn't know if she injured wrist but pain started yesterday, she states that while at work tonight the wrist started to swell and be more painful.

## 2020-08-10 ENCOUNTER — TELEPHONE (OUTPATIENT)
Dept: FAMILY MEDICINE CLINIC | Age: 35
End: 2020-08-10

## 2023-05-03 ENCOUNTER — HOSPITAL ENCOUNTER (EMERGENCY)
Age: 38
Discharge: HOME | End: 2023-05-03
Attending: EMERGENCY MEDICINE
Payer: COMMERCIAL

## 2023-05-03 VITALS
HEART RATE: 96 BPM | HEIGHT: 62 IN | BODY MASS INDEX: 38.64 KG/M2 | SYSTOLIC BLOOD PRESSURE: 156 MMHG | OXYGEN SATURATION: 100 % | WEIGHT: 210 LBS | TEMPERATURE: 97.2 F | RESPIRATION RATE: 17 BRPM | DIASTOLIC BLOOD PRESSURE: 89 MMHG

## 2023-05-03 DIAGNOSIS — L02.419 AXILLARY ABSCESS: Primary | ICD-10-CM

## 2023-05-03 PROCEDURE — 99283 EMERGENCY DEPT VISIT LOW MDM: CPT

## 2023-05-03 PROCEDURE — 10060 I&D ABSCESS SIMPLE/SINGLE: CPT

## 2023-05-03 PROCEDURE — 6370000000 HC RX 637 (ALT 250 FOR IP): Performed by: EMERGENCY MEDICINE

## 2023-05-03 RX ORDER — IBUPROFEN 600 MG/1
600 TABLET ORAL EVERY 6 HOURS PRN
Qty: 120 TABLET | Refills: 0 | Status: SHIPPED | OUTPATIENT
Start: 2023-05-03

## 2023-05-03 RX ORDER — ACETAMINOPHEN 325 MG/1
650 TABLET ORAL ONCE
Status: COMPLETED | OUTPATIENT
Start: 2023-05-03 | End: 2023-05-03

## 2023-05-03 RX ORDER — IBUPROFEN 800 MG/1
800 TABLET ORAL ONCE
Status: COMPLETED | OUTPATIENT
Start: 2023-05-03 | End: 2023-05-03

## 2023-05-03 RX ORDER — DOXYCYCLINE 100 MG/1
100 CAPSULE ORAL ONCE
Status: COMPLETED | OUTPATIENT
Start: 2023-05-03 | End: 2023-05-03

## 2023-05-03 RX ORDER — DOXYCYCLINE 100 MG/1
100 CAPSULE ORAL 2 TIMES DAILY
Qty: 14 CAPSULE | Refills: 0 | Status: SHIPPED | OUTPATIENT
Start: 2023-05-03 | End: 2023-05-10

## 2023-05-03 RX ADMIN — IBUPROFEN 800 MG: 800 TABLET, FILM COATED ORAL at 18:04

## 2023-05-03 RX ADMIN — ACETAMINOPHEN 650 MG: 325 TABLET ORAL at 18:06

## 2023-05-03 RX ADMIN — DOXYCYCLINE 100 MG: 100 CAPSULE ORAL at 18:04

## 2023-05-03 ASSESSMENT — PAIN DESCRIPTION - LOCATION: LOCATION: OTHER (COMMENT)

## 2023-05-03 ASSESSMENT — PAIN DESCRIPTION - ORIENTATION: ORIENTATION: RIGHT

## 2023-05-03 ASSESSMENT — PAIN DESCRIPTION - DESCRIPTORS: DESCRIPTORS: SHARP

## 2023-05-03 NOTE — ED TRIAGE NOTES
Mode of arrival (squad #, walk in, police, etc) : walk in         Chief complaint(s): abscess        Arrival Note (brief scenario, treatment PTA, etc). : Pt states abscess on RT underarm x5 days. C= \"Have you ever felt that you should Cut down on your drinking? \"  No  A= \"Have people Annoyed you by criticizing your drinking? \"  No  G= \"Have you ever felt bad or Guilty about your drinking? \"  No  E= \"Have you ever had a drink as an Eye-opener first thing in the morning to steady your nerves or to help a hangover? \"  No      Deferred []      Reason for deferring: NA    *If yes to two or more: probable alcohol abuse. *

## 2023-05-03 NOTE — ED NOTES
Discharge instructions reviewed with patient, noting all directions and education by provider. Patient verbalizes understanding of all information reviewed, gathered personal items, and transferred under own power off unit to lobby without incident.       Jana Kaur RN  05/03/23 9240

## 2023-05-04 NOTE — ED PROVIDER NOTES
EMERGENCY DEPARTMENT ENCOUNTER    Pt Name: Gina Kaye  MRN: [id number]  [place] 1985  Date of evaluation: 5/4/23  CHIEF COMPLAINT       Chief Complaint   Patient presents with    Abscess     HISTORY OF PRESENT ILLNESS   I have another abscess. This is a pleasant 30year-old hidradenitis suppurativa breast abscess in the right axilla. Patient ports she needs incision and drainage antibiotics. Patient any fever, chills, nausea, vomiting            REVIEW OF SYSTEMS     Review of Systems   Constitutional:  Negative for fever. Skin:  Positive for wound. PASTMEDICAL HISTORY   No past medical history on file. Past Problem List  Patient Active Problem List   Diagnosis Code    Pilonidal cyst L05.91    Abscess of sacrum ([facility]) M46.28    Pilonidal cyst L05.91    Cellulitis of sacral region L03.319     SURGICAL HISTORY       Past Surgical History:   Procedure Laterality Date    CYST REMOVAL      PILONIDAL CYST DRAINAGE  5/7/2013    i and d     CURRENT MEDICATIONS       Discharge Medication List as of 5/3/2023  6:29 PM        CONTINUE these medications which have NOT CHANGED    Details   cephALEXin (KEFLEX) 500 MG capsule Take 1 capsule by mouth 3 times daily, Disp-30 capsule,R-0Print           ALLERGIES     has No Known Allergies. FAMILY HISTORY     She indicated that her mother is alive. She indicated that her father is alive. She indicated that her sister is alive. She indicated that the status of her maternal grandfather is unknown. She indicated that the status of her paternal grandmother is unknown.      SOCIAL HISTORY       Social History     Tobacco Use    Smoking status: Never    Smokeless tobacco: Never   Vaping Use    Vaping Use: Every day   Substance Use Topics    Alcohol use: Yes     Comment: occ    Drug use: No     PHYSICAL EXAM     INITIAL VITALS: BP (!) 156/89   Pulse 96   Temp 97.2 °F (36.2 °C) (Temporal)   Resp 17   Ht 5' 2\" (1.575 m)   Wt 210 lb (95.3 kg)   LMP 04/27/2023

## 2023-05-07 ENCOUNTER — HOSPITAL ENCOUNTER (EMERGENCY)
Age: 38
Discharge: HOME | End: 2023-05-07
Attending: EMERGENCY MEDICINE
Payer: COMMERCIAL

## 2023-05-07 VITALS
SYSTOLIC BLOOD PRESSURE: 139 MMHG | RESPIRATION RATE: 16 BRPM | DIASTOLIC BLOOD PRESSURE: 92 MMHG | BODY MASS INDEX: 38.64 KG/M2 | OXYGEN SATURATION: 99 % | HEIGHT: 62 IN | HEART RATE: 102 BPM | WEIGHT: 210 LBS | TEMPERATURE: 97.5 F

## 2023-05-07 DIAGNOSIS — L02.419 AXILLARY ABSCESS: Primary | ICD-10-CM

## 2023-05-07 PROCEDURE — 99282 EMERGENCY DEPT VISIT SF MDM: CPT

## 2023-05-07 PROCEDURE — 2500000003 HC RX 250 WO HCPCS: Performed by: STUDENT IN AN ORGANIZED HEALTH CARE EDUCATION/TRAINING PROGRAM

## 2023-05-07 RX ORDER — LIDOCAINE HYDROCHLORIDE 10 MG/ML
20 INJECTION, SOLUTION INFILTRATION; PERINEURAL ONCE
Status: COMPLETED | OUTPATIENT
Start: 2023-05-07 | End: 2023-05-07

## 2023-05-07 RX ADMIN — LIDOCAINE HYDROCHLORIDE 20 ML: 10 INJECTION, SOLUTION INFILTRATION; PERINEURAL at 17:55

## 2023-05-07 ASSESSMENT — ENCOUNTER SYMPTOMS
NAUSEA: 0
COUGH: 0
SHORTNESS OF BREATH: 0
CONSTIPATION: 0
ABDOMINAL PAIN: 0
DIARRHEA: 0
VOMITING: 0

## 2023-05-07 ASSESSMENT — PAIN - FUNCTIONAL ASSESSMENT: PAIN_FUNCTIONAL_ASSESSMENT: 0-10

## 2023-05-07 ASSESSMENT — PAIN SCALES - GENERAL: PAINLEVEL_OUTOF10: 10

## 2023-05-07 ASSESSMENT — PAIN DESCRIPTION - ORIENTATION: ORIENTATION: RIGHT

## 2023-05-07 ASSESSMENT — PAIN DESCRIPTION - LOCATION: LOCATION: ARM

## 2024-08-01 ENCOUNTER — HOSPITAL ENCOUNTER (EMERGENCY)
Age: 39
Discharge: HOME | End: 2024-08-01
Attending: EMERGENCY MEDICINE
Payer: COMMERCIAL

## 2024-08-01 VITALS
WEIGHT: 210 LBS | DIASTOLIC BLOOD PRESSURE: 89 MMHG | HEIGHT: 62 IN | OXYGEN SATURATION: 100 % | SYSTOLIC BLOOD PRESSURE: 153 MMHG | HEART RATE: 77 BPM | TEMPERATURE: 98.4 F | RESPIRATION RATE: 16 BRPM | BODY MASS INDEX: 38.64 KG/M2

## 2024-08-01 DIAGNOSIS — L02.91 ABSCESS: Primary | ICD-10-CM

## 2024-08-01 PROCEDURE — 10061 I&D ABSCESS COMP/MULTIPLE: CPT

## 2024-08-01 PROCEDURE — 99283 EMERGENCY DEPT VISIT LOW MDM: CPT

## 2024-08-01 PROCEDURE — 6370000000 HC RX 637 (ALT 250 FOR IP): Performed by: EMERGENCY MEDICINE

## 2024-08-01 PROCEDURE — 2500000003 HC RX 250 WO HCPCS: Performed by: EMERGENCY MEDICINE

## 2024-08-01 RX ORDER — SULFAMETHOXAZOLE AND TRIMETHOPRIM 800; 160 MG/1; MG/1
1 TABLET ORAL 2 TIMES DAILY
Qty: 14 TABLET | Refills: 0 | Status: SHIPPED | OUTPATIENT
Start: 2024-08-01 | End: 2024-08-08

## 2024-08-01 RX ORDER — ACETAMINOPHEN 500 MG
1000 TABLET ORAL ONCE
Status: COMPLETED | OUTPATIENT
Start: 2024-08-01 | End: 2024-08-01

## 2024-08-01 RX ORDER — LIDOCAINE HYDROCHLORIDE 10 MG/ML
10 INJECTION, SOLUTION INFILTRATION; PERINEURAL ONCE
Status: COMPLETED | OUTPATIENT
Start: 2024-08-01 | End: 2024-08-01

## 2024-08-01 RX ORDER — ACETAMINOPHEN 500 MG
1000 TABLET ORAL EVERY 6 HOURS PRN
Qty: 60 TABLET | Refills: 0 | Status: SHIPPED | OUTPATIENT
Start: 2024-08-01

## 2024-08-01 RX ORDER — SULFAMETHOXAZOLE AND TRIMETHOPRIM 800; 160 MG/1; MG/1
1 TABLET ORAL ONCE
Status: COMPLETED | OUTPATIENT
Start: 2024-08-01 | End: 2024-08-01

## 2024-08-01 RX ORDER — IBUPROFEN 800 MG/1
800 TABLET ORAL ONCE
Status: COMPLETED | OUTPATIENT
Start: 2024-08-01 | End: 2024-08-01

## 2024-08-01 RX ORDER — IBUPROFEN 600 MG/1
600 TABLET ORAL EVERY 6 HOURS PRN
Qty: 60 TABLET | Refills: 0 | Status: SHIPPED | OUTPATIENT
Start: 2024-08-01

## 2024-08-01 RX ADMIN — LIDOCAINE HYDROCHLORIDE 10 ML: 10 INJECTION, SOLUTION INFILTRATION; PERINEURAL at 12:06

## 2024-08-01 RX ADMIN — SULFAMETHOXAZOLE AND TRIMETHOPRIM 1 TABLET: 800; 160 TABLET ORAL at 09:30

## 2024-08-01 RX ADMIN — ACETAMINOPHEN 1000 MG: 500 TABLET ORAL at 09:30

## 2024-08-01 RX ADMIN — IBUPROFEN 800 MG: 800 TABLET, FILM COATED ORAL at 09:30

## 2024-08-01 ASSESSMENT — PAIN - FUNCTIONAL ASSESSMENT: PAIN_FUNCTIONAL_ASSESSMENT: 0-10

## 2024-08-01 ASSESSMENT — PAIN SCALES - GENERAL: PAINLEVEL_OUTOF10: 9

## 2024-08-01 ASSESSMENT — PAIN DESCRIPTION - ORIENTATION: ORIENTATION: RIGHT

## 2024-08-01 ASSESSMENT — PAIN DESCRIPTION - DESCRIPTORS: DESCRIPTORS: ACHING

## 2024-08-01 ASSESSMENT — PAIN DESCRIPTION - LOCATION: LOCATION: ARM

## 2024-08-01 NOTE — ED PROVIDER NOTES
EMERGENCY DEPARTMENT ENCOUNTER    Pt Name: Jeane Brewer  MRN: [id number]  Birthdate 1985  Date of evaluation: 8/1/24  CHIEF COMPLAINT       Chief Complaint   Patient presents with    Abscess     To right axilla since last Sunday, states she usually can treat at home with warm compresses but has not improved     HISTORY OF PRESENT ILLNESS   HPI  Has a boil under her right armpit.  Been there for the past few days.  Getting worse.  Painful at work.  No fever chills sweats.  No numbness tingling.  No traumas falls or injuries.  Last menstrual period is now.  Denies chance of pregnancy.      PASTMEDICAL HISTORY     Past Medical History:   Diagnosis Date    Hidradenitis suppurativa      Past Problem List  Patient Active Problem List   Diagnosis Code    Pilonidal cyst L05.91    Abscess of sacrum ([facility]) M46.28    Pilonidal cyst L05.91    Cellulitis of sacral region L03.319     SURGICAL HISTORY       Past Surgical History:   Procedure Laterality Date    CYST REMOVAL      PILONIDAL CYST DRAINAGE  5/7/2013    i and d     CURRENT MEDICATIONS       Discharge Medication List as of 8/1/2024 12:04 PM        CONTINUE these medications which have NOT CHANGED    Details   cephALEXin (KEFLEX) 500 MG capsule Take 1 capsule by mouth 3 times daily, Disp-30 capsule,R-0Print           ALLERGIES     has No Known Allergies.  FAMILY HISTORY     She indicated that her mother is alive. She indicated that her father is alive. She indicated that her sister is alive. She indicated that the status of her maternal grandfather is unknown. She indicated that the status of her paternal grandmother is unknown.     SOCIAL HISTORY       Social History     Tobacco Use    Smoking status: Never    Smokeless tobacco: Never   Vaping Use    Vaping Use: Every day    Substances: Nicotine   Substance Use Topics    Alcohol use: Yes     Comment: occ    Drug use: No     PHYSICAL EXAM     INITIAL VITALS: BP (!) 153/89   Pulse 77   Temp 98.4 °F (36.9 °C) (Oral)

## 2024-08-01 NOTE — ED PROVIDER NOTES
Incision/Drainage    Date/Time: 8/1/2024 11:58 AM    Performed by: Lexie Villagomez PA  Authorized by: Lazarus Cabral MD    Consent:     Consent obtained:  Verbal    Consent given by:  Patient    Risks, benefits, and alternatives were discussed: yes    Universal protocol:     Procedure explained and questions answered to patient or proxy's satisfaction: yes    Location:     Type:  Abscess    Location: right axilla.  Pre-procedure details:     Skin preparation:  Povidone-iodine  Sedation:     Sedation type:  None  Anesthesia:     Anesthesia method:  Local infiltration    Local anesthetic:  Lidocaine 1% w/o epi  Procedure details:     Ultrasound guidance: no      Incision types:  Single straight    Incision depth:  Subcutaneous    Wound management:  Probed and deloculated and irrigated with saline    Drainage:  Purulent    Drainage amount:  Copious    Wound treatment:  Wound left open    Packing materials:  None  Post-procedure details:     Procedure completion:  Tolerated well, no immediate complications  Comments:      Sterile gauze bandage applied        Lexie Villagomez PA  08/01/24 1200